# Patient Record
Sex: MALE | Race: WHITE | NOT HISPANIC OR LATINO | Employment: FULL TIME | URBAN - METROPOLITAN AREA
[De-identification: names, ages, dates, MRNs, and addresses within clinical notes are randomized per-mention and may not be internally consistent; named-entity substitution may affect disease eponyms.]

---

## 2017-01-06 ENCOUNTER — OFFICE VISIT (OUTPATIENT)
Dept: INTERNAL MEDICINE | Facility: CLINIC | Age: 67
End: 2017-01-06

## 2017-01-06 VITALS
DIASTOLIC BLOOD PRESSURE: 68 MMHG | WEIGHT: 176.6 LBS | RESPIRATION RATE: 16 BRPM | HEART RATE: 76 BPM | HEIGHT: 70 IN | OXYGEN SATURATION: 99 % | BODY MASS INDEX: 25.28 KG/M2 | SYSTOLIC BLOOD PRESSURE: 128 MMHG | TEMPERATURE: 97.9 F

## 2017-01-06 DIAGNOSIS — I26.99 PE (PULMONARY THROMBOEMBOLISM) (HCC): ICD-10-CM

## 2017-01-06 DIAGNOSIS — I82.412 ACUTE DEEP VEIN THROMBOSIS (DVT) OF FEMORAL VEIN OF LEFT LOWER EXTREMITY (HCC): Primary | ICD-10-CM

## 2017-01-06 PROCEDURE — 99213 OFFICE O/P EST LOW 20 MIN: CPT | Performed by: INTERNAL MEDICINE

## 2017-01-06 NOTE — MR AVS SNAPSHOT
Dennis De Anda   1/6/2017 9:20 AM   Office Visit    Provider:  Scott Gonzales MD   Department:  [facility] INTERNAL MEDICINE   Dept Phone:  [phone]                Your Full Care Plan              Today's Medication Changes          These changes are accurate as of: 1/6/17 10:28 AM.  If you have any questions, ask your nurse or doctor.               Medication(s)that have changed:     rivaroxaban 20 MG tablet   Commonly known as:  XARELTO   Take 1 tablet by mouth Daily.   What changed:  Another medication with the same name was removed. Continue taking this medication, and follow the directions you see here.   Changed by:  Scott Gonzales MD                  Your Updated Medication List          This list is accurate as of: 1/6/17 10:28 AM.  Always use your most recent med list.                rivaroxaban 20 MG tablet   Commonly known as:  XARELTO   Take 1 tablet by mouth Daily.               You Were Diagnosed With        Codes Comments    Acute deep vein thrombosis (DVT) of femoral vein of left lower extremity    -  Primary ICD-10-CM: I82.412  ICD-9-CM: 453.41     PE (pulmonary thromboembolism)     ICD-10-CM: I26.99  ICD-9-CM: 415.19       Instructions     None    Patient Instructions History      MyChart Signup     Our records indicate that you have an active [employer] account.    You can view your After Visit Summary by going to [employer] and logging in with your [email or web address] username and password.  If you don't have a [email or web address] username and password but a parent or guardian has access to your record, the parent or guardian should login with their own [email or web address] username and password and access your record to view the After Visit Summary.    If you have questions, you can email [email or web address] or call [phone] to talk to our [email or web address] staff.  Remember, [email or web address] is NOT to be used for urgent needs.  For medical emergencies, dial  "911.               Other Info from Your Visit           Your Appointments     Jun 09, 2017  9:00 AM EDT   Follow Up with Scott Gonzales MD   [facility] INTERNAL MEDICINE (--)    [address] [id number] [phone]           Arrive 15 minutes prior to appointment.            Aug 11, 2017  8:30 AM EDT   Physical with Scott Gonzales MD   [facility] INTERNAL MEDICINE (--)    [address] [id number] [phone]           Arrive 15 minutes prior to appointment.              Allergies     Penicillins  Rash    At injection site      Reason for Visit     Deep Vein Thrombosis           Vital Signs     Blood Pressure Pulse Temperature Respirations Height Weight    128/68 (BP Location: Left arm, Patient Position: Sitting, Cuff Size: Large Adult) 76 97.9 °F (36.6 °C) (Oral) 16 70\" (177.8 cm) 176 lb 9.6 oz (80.1 kg)    Oxygen Saturation Body Mass Index Smoking Status             99% 25.34 kg/m2 Never Smoker         Problems and Diagnoses Noted     Blood clot in vein    PE (pulmonary thromboembolism)      "

## 2017-01-06 NOTE — PROGRESS NOTES
Subjective   Dennis De Anda is a 66 y.o. male.     Chief Complaint   Patient presents with   • Deep Vein Thrombosis         HPI Comments: In for follow-up of hospitalization with a DVT and PE.  Just a few small peripheral PEs on CT scan.  Extensive DVT left leg which was proximal femoral lead to thrombolysis and embolectomy.  Left arm pain which is been present for several weeks is now resolved completely.    Deep Vein Thrombosis   Pertinent negatives include no chest pain, chills, coughing, fatigue or fever.        The following portions of the patient's history were reviewed and updated as appropriate: allergies, current medications, past social history and problem list.    Outpatient Prescriptions Marked as Taking for the 1/6/17 encounter (Office Visit) with Scott Gonzales MD   Medication Sig Dispense Refill   • rivaroxaban (XARELTO) 20 MG tablet Take 1 tablet by mouth Daily. 90 tablet 0       Review of Systems   Constitutional: Negative for chills, fatigue and fever.   Respiratory: Negative for cough, shortness of breath and wheezing.    Cardiovascular: Positive for leg swelling. Negative for chest pain and palpitations.       Objective   Vitals:    01/06/17 0923   BP: 128/68   Pulse: 76   Resp: 16   Temp: 97.9 °F (36.6 °C)   SpO2: 99%      Last Weight    01/06/17  0923   Weight: 176 lb 9.6 oz (80.1 kg)    [unfilled]  Body mass index is 25.34 kg/(m^2).      Physical Exam   Constitutional: He appears well-developed and well-nourished.   Cardiovascular: Normal rate, regular rhythm, normal heart sounds and intact distal pulses.    Pulmonary/Chest: Effort normal and breath sounds normal.         Problem List Items Addressed This Visit        Cardiovascular and Mediastinum    PE (pulmonary thromboembolism)    Acute deep vein thrombosis (DVT) of femoral vein of left lower extremity - Primary        Assessment/Plan   In for recheck of DVT and PE.  Small peripheral PE but extensive DVT left leg.  Had thrombolysis  and embolectomy.  Being followed by vascular surgery.  The DVT occurred following plane ride to [place] followed 3 weeks later by a road trip/car ride to [facility].  He had some left shoulder pain for several months that was unusual and now is clear completely.  Hard to know whether that was cervical radiculopathy or rhythm was related to the PEs.  For now the plan is 6 months of treatment with Xarelto.  He's using a support hose. Hypercoagulation studies have come back normal.  No other inciting events or risk factors noted.  He may need a dental extraction but is going to put that off as long as he can.  Has an upcoming venous scan per vascular surgery.         Dragon disclaimer:   Much of this encounter note is an electronic transcription/translation of spoken language to printed text. The electronic translation of spoken language may permit erroneous, or at times, nonsensical words or phrases to be inadvertently transcribed; Although I have reviewed the note for such errors, some may still exist.

## 2017-03-24 RX ORDER — RIVAROXABAN 20 MG/1
TABLET, FILM COATED ORAL
Qty: 90 TABLET | Refills: 1 | Status: SHIPPED | OUTPATIENT
Start: 2017-03-24 | End: 2017-05-12 | Stop reason: SDUPTHER

## 2017-05-12 ENCOUNTER — OFFICE VISIT (OUTPATIENT)
Dept: INTERNAL MEDICINE | Facility: CLINIC | Age: 67
End: 2017-05-12

## 2017-05-12 VITALS
HEIGHT: 70 IN | DIASTOLIC BLOOD PRESSURE: 68 MMHG | OXYGEN SATURATION: 97 % | WEIGHT: 171.8 LBS | SYSTOLIC BLOOD PRESSURE: 122 MMHG | HEART RATE: 67 BPM | BODY MASS INDEX: 24.6 KG/M2 | TEMPERATURE: 98.1 F | RESPIRATION RATE: 16 BRPM

## 2017-05-12 DIAGNOSIS — I82.412 ACUTE DEEP VEIN THROMBOSIS (DVT) OF FEMORAL VEIN OF LEFT LOWER EXTREMITY (HCC): Primary | ICD-10-CM

## 2017-05-12 PROCEDURE — G0438 PPPS, INITIAL VISIT: HCPCS | Performed by: INTERNAL MEDICINE

## 2017-05-12 PROCEDURE — 99214 OFFICE O/P EST MOD 30 MIN: CPT | Performed by: INTERNAL MEDICINE

## 2017-06-27 ENCOUNTER — OFFICE VISIT (OUTPATIENT)
Dept: INTERNAL MEDICINE | Facility: CLINIC | Age: 67
End: 2017-06-27

## 2017-06-27 VITALS
BODY MASS INDEX: 24.08 KG/M2 | RESPIRATION RATE: 16 BRPM | SYSTOLIC BLOOD PRESSURE: 134 MMHG | TEMPERATURE: 98 F | HEART RATE: 57 BPM | HEIGHT: 70 IN | DIASTOLIC BLOOD PRESSURE: 70 MMHG | OXYGEN SATURATION: 98 % | WEIGHT: 168.2 LBS

## 2017-06-27 DIAGNOSIS — E55.9 VITAMIN D DEFICIENCY: ICD-10-CM

## 2017-06-27 DIAGNOSIS — R73.02 IGT (IMPAIRED GLUCOSE TOLERANCE): ICD-10-CM

## 2017-06-27 DIAGNOSIS — C61 PROSTATE CANCER (HCC): ICD-10-CM

## 2017-06-27 DIAGNOSIS — R35.89 POLYURIA: ICD-10-CM

## 2017-06-27 DIAGNOSIS — M79.605 PAIN OF LEFT LOWER EXTREMITY: Primary | ICD-10-CM

## 2017-06-27 LAB
25(OH)D3+25(OH)D2 SERPL-MCNC: 30.7 NG/ML (ref 30–100)
ALBUMIN SERPL-MCNC: 4.4 G/DL (ref 3.5–5.2)
ALBUMIN/GLOB SERPL: 1.8 G/DL
ALP SERPL-CCNC: 50 U/L (ref 39–117)
ALT SERPL-CCNC: 19 U/L (ref 1–41)
AST SERPL-CCNC: 19 U/L (ref 1–40)
BASOPHILS # BLD AUTO: 0.01 10*3/MM3 (ref 0–0.2)
BASOPHILS NFR BLD AUTO: 0.2 % (ref 0–1.5)
BILIRUB SERPL-MCNC: 0.5 MG/DL (ref 0.1–1.2)
BUN SERPL-MCNC: 14 MG/DL (ref 8–23)
BUN/CREAT SERPL: 15.2 (ref 7–25)
CALCIUM SERPL-MCNC: 9.5 MG/DL (ref 8.6–10.5)
CHLORIDE SERPL-SCNC: 101 MMOL/L (ref 98–107)
CO2 SERPL-SCNC: 23.2 MMOL/L (ref 22–29)
CREAT SERPL-MCNC: 0.92 MG/DL (ref 0.76–1.27)
EOSINOPHIL # BLD AUTO: 0.03 10*3/MM3 (ref 0–0.7)
EOSINOPHIL NFR BLD AUTO: 0.7 % (ref 0.3–6.2)
ERYTHROCYTE [DISTWIDTH] IN BLOOD BY AUTOMATED COUNT: 12.9 % (ref 11.5–14.5)
GLOBULIN SER CALC-MCNC: 2.5 GM/DL
GLUCOSE SERPL-MCNC: 93 MG/DL (ref 65–99)
HBA1C MFR BLD: 5.5 % (ref 4.8–5.6)
HCT VFR BLD AUTO: 43 % (ref 40.4–52.2)
HGB BLD-MCNC: 14.1 G/DL (ref 13.7–17.6)
IMM GRANULOCYTES # BLD: 0 10*3/MM3 (ref 0–0.03)
IMM GRANULOCYTES NFR BLD: 0 % (ref 0–0.5)
LYMPHOCYTES # BLD AUTO: 1.44 10*3/MM3 (ref 0.9–4.8)
LYMPHOCYTES NFR BLD AUTO: 31.6 % (ref 19.6–45.3)
MCH RBC QN AUTO: 30.3 PG (ref 27–32.7)
MCHC RBC AUTO-ENTMCNC: 32.8 G/DL (ref 32.6–36.4)
MCV RBC AUTO: 92.5 FL (ref 79.8–96.2)
MONOCYTES # BLD AUTO: 0.49 10*3/MM3 (ref 0.2–1.2)
MONOCYTES NFR BLD AUTO: 10.8 % (ref 5–12)
NEUTROPHILS # BLD AUTO: 2.58 10*3/MM3 (ref 1.9–8.1)
NEUTROPHILS NFR BLD AUTO: 56.7 % (ref 42.7–76)
PLATELET # BLD AUTO: 183 10*3/MM3 (ref 140–500)
POTASSIUM SERPL-SCNC: 4.4 MMOL/L (ref 3.5–5.2)
PROT SERPL-MCNC: 6.9 G/DL (ref 6–8.5)
PSA SERPL-MCNC: 0.06 NG/ML (ref 0–4)
RBC # BLD AUTO: 4.65 10*6/MM3 (ref 4.6–6)
SODIUM SERPL-SCNC: 140 MMOL/L (ref 136–145)
WBC # BLD AUTO: 4.55 10*3/MM3 (ref 4.5–10.7)

## 2017-06-27 PROCEDURE — 99213 OFFICE O/P EST LOW 20 MIN: CPT | Performed by: INTERNAL MEDICINE

## 2017-06-27 NOTE — PROGRESS NOTES
Subjective   Dennis De Anda is a 67 y.o. male.     Chief Complaint   Patient presents with   • Leg Pain     Left leg pain         Leg Pain    The incident occurred more than 1 week ago. There was no injury mechanism. The pain is present in the left leg. The quality of the pain is described as aching. The pain is moderate. The pain has been constant since onset. Nothing aggravates the symptoms. He has tried nothing for the symptoms.        The following portions of the patient's history were reviewed and updated as appropriate: allergies, current medications, past social history and problem list.    Outpatient Prescriptions Marked as Taking for the 6/27/17 encounter (Office Visit) with Scott Gonzales MD   Medication Sig Dispense Refill   • apixaban (ELIQUIS) 2.5 MG tablet tablet Take 1 tablet by mouth 2 (Two) Times a Day. 180 tablet 3       Review of Systems   Constitutional: Negative for chills and fever.   Respiratory: Negative for shortness of breath and wheezing.    Cardiovascular: Positive for leg swelling.   Musculoskeletal: Positive for myalgias.       Objective   Vitals:    06/27/17 1402   BP: 134/70   Pulse: 57   Resp: 16   Temp: 98 °F (36.7 °C)   SpO2: 98%      Last Weight    06/27/17  1402   Weight: 168 lb 3.2 oz (76.3 kg)    [unfilled]  Body mass index is 24.13 kg/(m^2).      Physical Exam   Constitutional: He appears well-developed and well-nourished.   Cardiovascular: Normal rate, regular rhythm, normal heart sounds and intact distal pulses.  Exam reveals no friction rub.    No murmur heard.  Pulmonary/Chest: Effort normal and breath sounds normal. No respiratory distress. He has no wheezes. He has no rales.   Musculoskeletal: Normal range of motion. He exhibits edema. He exhibits no tenderness or deformity.         Problem List Items Addressed This Visit        Digestive    Vitamin D deficiency    Relevant Orders    Vitamin D 25 Hydroxy       Endocrine    IGT (impaired glucose tolerance)     Relevant Orders    Hemoglobin A1c       Genitourinary    Prostate cancer    Relevant Orders    PSA      Other Visit Diagnoses     Pain of left lower extremity    -  Primary    Polyuria        Relevant Orders    CBC & Differential    Comprehensive Metabolic Panel        Assessment/Plan   In with left leg pain.  He's had some low-grade pain ever since his large DVT November 2016.  This seems worse in the past week.  He was up last night with some cramps in the left calf.  Some of his pain is right behind his knee in the popliteal fossa.  He has known osteoarthritis in the left knee.  Exam today is unremarkable.  He has very little swelling.  He's been using a support hose.  He is on low dose eliquis.  I think recurrent DVT would be most unusual in this setting.  Question of today is whether he is got some chronic pain syndrome related to his prior DVT and instrumentation or whether he's got a second cause of leg pain mainly restless leg or DJD.  We'll have vascular surgeons see him again.  He also complains of polyuria.  Some lab work today including CBC, CMP, UA, PSA, and vitamin D level.         Rudolph disclaimer:   Much of this encounter note is an electronic transcription/translation of spoken language to printed text. The electronic translation of spoken language may permit erroneous, or at times, nonsensical words or phrases to be inadvertently transcribed; Although I have reviewed the note for such errors, some may still exist.

## 2017-11-17 ENCOUNTER — OFFICE VISIT (OUTPATIENT)
Dept: INTERNAL MEDICINE | Facility: CLINIC | Age: 67
End: 2017-11-17

## 2017-11-17 VITALS
BODY MASS INDEX: 25.08 KG/M2 | HEART RATE: 51 BPM | TEMPERATURE: 97.7 F | SYSTOLIC BLOOD PRESSURE: 126 MMHG | WEIGHT: 175.2 LBS | OXYGEN SATURATION: 96 % | DIASTOLIC BLOOD PRESSURE: 70 MMHG | RESPIRATION RATE: 16 BRPM | HEIGHT: 70 IN

## 2017-11-17 DIAGNOSIS — I82.412 ACUTE DEEP VEIN THROMBOSIS (DVT) OF FEMORAL VEIN OF LEFT LOWER EXTREMITY (HCC): ICD-10-CM

## 2017-11-17 DIAGNOSIS — Z23 NEED FOR IMMUNIZATION AGAINST INFLUENZA: Primary | ICD-10-CM

## 2017-11-17 DIAGNOSIS — Z23 NEED FOR VACCINATION: ICD-10-CM

## 2017-11-17 DIAGNOSIS — R73.02 IGT (IMPAIRED GLUCOSE TOLERANCE): ICD-10-CM

## 2017-11-17 PROCEDURE — 99213 OFFICE O/P EST LOW 20 MIN: CPT | Performed by: INTERNAL MEDICINE

## 2017-11-17 PROCEDURE — G0009 ADMIN PNEUMOCOCCAL VACCINE: HCPCS | Performed by: INTERNAL MEDICINE

## 2017-11-17 PROCEDURE — G0008 ADMIN INFLUENZA VIRUS VAC: HCPCS | Performed by: INTERNAL MEDICINE

## 2017-11-17 PROCEDURE — 90686 IIV4 VACC NO PRSV 0.5 ML IM: CPT | Performed by: INTERNAL MEDICINE

## 2017-11-17 PROCEDURE — 90670 PCV13 VACCINE IM: CPT | Performed by: INTERNAL MEDICINE

## 2017-11-17 NOTE — PROGRESS NOTES
Subjective   Dennis De Anda is a 67 y.o. male.     Chief Complaint   Patient presents with   • Anemia   • Hyperglycemia   • Deep Vein Thrombosis         HPI Comments: In for recheck of DVT and PE.  Just a few small peripheral PEs on CT scan.  Extensive DVT left leg which was proximal femoral lead to thrombolysis and embolectomy.      Anemia   There has been no fever.   Hyperglycemia   This is a chronic problem. The current episode started more than 1 year ago. The problem occurs constantly. The problem has been unchanged. Pertinent negatives include no chest pain, chills, coughing, fatigue or fever.   Deep Vein Thrombosis   This is a chronic problem. The current episode started more than 1 month ago. The problem has been gradually improving. Pertinent negatives include no chest pain, chills, coughing, fatigue or fever. The symptoms are aggravated by standing.        The following portions of the patient's history were reviewed and updated as appropriate: allergies, current medications, past social history and problem list.    Outpatient Prescriptions Marked as Taking for the 11/17/17 encounter (Office Visit) with Scott Gonzales MD   Medication Sig Dispense Refill   • apixaban (ELIQUIS) 2.5 MG tablet tablet Take 1 tablet by mouth 2 (Two) Times a Day. 180 tablet 3       Review of Systems   Constitutional: Negative for chills, fatigue and fever.   Respiratory: Negative for cough, shortness of breath and wheezing.    Cardiovascular: Positive for leg swelling. Negative for chest pain.       Objective   Vitals:    11/17/17 0814   BP: 126/70   Pulse: 51   Resp: 16   Temp: 97.7 °F (36.5 °C)   SpO2: 96%      Last Weight    11/17/17 0814   Weight: 175 lb 3.2 oz (79.5 kg)    [unfilled]  Body mass index is 25.14 kg/(m^2).      Physical Exam   Constitutional: He appears well-developed and well-nourished.   Cardiovascular: Normal rate, regular rhythm, normal heart sounds and intact distal pulses.    Pulmonary/Chest:  Effort normal and breath sounds normal.         Problem List Items Addressed This Visit     None      Visit Diagnoses     Need for immunization against influenza    -  Primary    Relevant Orders    Flu Vaccine Quad PF 3YR+ (Completed)        Assessment/Plan   In for recheck of DVT and PE.  Small peripheral PE but extensive DVT left leg.  Had thrombolysis and embolectomy.  The DVT occurred following plane ride to [place] followed 3 weeks later by a road trip/car ride to [facility].  He had some left shoulder pain for several months that was unusual and now is clear completely.  Hard to know whether that was cervical radiculopathy or was related to the PEs.  For now he has completed 6 months of treatment with Xarelto.  Low-dose prophylaxis program.  He's using a support hose.  Annual wellness visit done today.  We will recheck in 6 months.  Greater than 15 minutes spent with patient, over 50% counseling him on his dvt.  We'll plan a minimum of one more year of low dose eliquis.  At least out to December 2018.  Flu shot today.  Prevnar today.  Pneumovax November 2018.  Annual lab work May 2018.         Dragon disclaimer:   Much of this encounter note is an electronic transcription/translation of spoken language to printed text. The electronic translation of spoken language may permit erroneous, or at times, nonsensical words or phrases to be inadvertently transcribed; Although I have reviewed the note for such errors, some may still exist.

## 2018-05-31 ENCOUNTER — OFFICE VISIT (OUTPATIENT)
Dept: INTERNAL MEDICINE | Facility: CLINIC | Age: 68
End: 2018-05-31

## 2018-05-31 VITALS
DIASTOLIC BLOOD PRESSURE: 64 MMHG | SYSTOLIC BLOOD PRESSURE: 128 MMHG | BODY MASS INDEX: 24.65 KG/M2 | TEMPERATURE: 98.1 F | RESPIRATION RATE: 16 BRPM | OXYGEN SATURATION: 97 % | WEIGHT: 172.2 LBS | HEART RATE: 56 BPM | HEIGHT: 70 IN

## 2018-05-31 DIAGNOSIS — I82.412 ACUTE DEEP VEIN THROMBOSIS (DVT) OF FEMORAL VEIN OF LEFT LOWER EXTREMITY (HCC): ICD-10-CM

## 2018-05-31 DIAGNOSIS — C61 PROSTATE CANCER (HCC): ICD-10-CM

## 2018-05-31 DIAGNOSIS — I26.99 PE (PULMONARY THROMBOEMBOLISM) (HCC): Primary | ICD-10-CM

## 2018-05-31 DIAGNOSIS — G47.33 OSA (OBSTRUCTIVE SLEEP APNEA): ICD-10-CM

## 2018-05-31 DIAGNOSIS — R73.02 IGT (IMPAIRED GLUCOSE TOLERANCE): ICD-10-CM

## 2018-05-31 DIAGNOSIS — Z79.899 MEDICATION MANAGEMENT: ICD-10-CM

## 2018-05-31 LAB
ALBUMIN SERPL-MCNC: 4.2 G/DL (ref 3.5–5.2)
ALBUMIN/GLOB SERPL: 1.8 G/DL
ALP SERPL-CCNC: 54 U/L (ref 39–117)
ALT SERPL-CCNC: 24 U/L (ref 1–41)
AST SERPL-CCNC: 26 U/L (ref 1–40)
BASOPHILS # BLD AUTO: 0.01 10*3/MM3 (ref 0–0.2)
BASOPHILS NFR BLD AUTO: 0.2 % (ref 0–1.5)
BILIRUB SERPL-MCNC: 0.3 MG/DL (ref 0.1–1.2)
BUN SERPL-MCNC: 20 MG/DL (ref 8–23)
BUN/CREAT SERPL: 20 (ref 7–25)
CALCIUM SERPL-MCNC: 8.9 MG/DL (ref 8.6–10.5)
CHLORIDE SERPL-SCNC: 102 MMOL/L (ref 98–107)
CHOLEST SERPL-MCNC: 236 MG/DL (ref 0–200)
CO2 SERPL-SCNC: 23.5 MMOL/L (ref 22–29)
CREAT SERPL-MCNC: 1 MG/DL (ref 0.76–1.27)
EOSINOPHIL # BLD AUTO: 0.1 10*3/MM3 (ref 0–0.7)
EOSINOPHIL NFR BLD AUTO: 2.3 % (ref 0.3–6.2)
ERYTHROCYTE [DISTWIDTH] IN BLOOD BY AUTOMATED COUNT: 13.6 % (ref 11.5–14.5)
GFR SERPLBLD CREATININE-BSD FMLA CKD-EPI: 75 ML/MIN/1.73
GFR SERPLBLD CREATININE-BSD FMLA CKD-EPI: 90 ML/MIN/1.73
GLOBULIN SER CALC-MCNC: 2.3 GM/DL
GLUCOSE SERPL-MCNC: 96 MG/DL (ref 65–99)
HBA1C MFR BLD: 5.5 % (ref 4.8–5.6)
HCT VFR BLD AUTO: 44.1 % (ref 40.4–52.2)
HDLC SERPL-MCNC: 73 MG/DL (ref 40–60)
HGB BLD-MCNC: 13.8 G/DL (ref 13.7–17.6)
IMM GRANULOCYTES # BLD: 0 10*3/MM3 (ref 0–0.03)
IMM GRANULOCYTES NFR BLD: 0 % (ref 0–0.5)
LDLC SERPL CALC-MCNC: 148 MG/DL (ref 0–100)
LYMPHOCYTES # BLD AUTO: 1.66 10*3/MM3 (ref 0.9–4.8)
LYMPHOCYTES NFR BLD AUTO: 37.8 % (ref 19.6–45.3)
MCH RBC QN AUTO: 29.9 PG (ref 27–32.7)
MCHC RBC AUTO-ENTMCNC: 31.3 G/DL (ref 32.6–36.4)
MCV RBC AUTO: 95.7 FL (ref 79.8–96.2)
MONOCYTES # BLD AUTO: 0.46 10*3/MM3 (ref 0.2–1.2)
MONOCYTES NFR BLD AUTO: 10.5 % (ref 5–12)
NEUTROPHILS # BLD AUTO: 2.16 10*3/MM3 (ref 1.9–8.1)
NEUTROPHILS NFR BLD AUTO: 49.2 % (ref 42.7–76)
PLATELET # BLD AUTO: 159 10*3/MM3 (ref 140–500)
POTASSIUM SERPL-SCNC: 4.4 MMOL/L (ref 3.5–5.2)
PROT SERPL-MCNC: 6.5 G/DL (ref 6–8.5)
PSA SERPL-MCNC: 0.08 NG/ML (ref 0–4)
RBC # BLD AUTO: 4.61 10*6/MM3 (ref 4.6–6)
SODIUM SERPL-SCNC: 140 MMOL/L (ref 136–145)
TRIGL SERPL-MCNC: 74 MG/DL (ref 0–150)
VLDLC SERPL CALC-MCNC: 14.8 MG/DL (ref 5–40)
WBC # BLD AUTO: 4.39 10*3/MM3 (ref 4.5–10.7)

## 2018-05-31 PROCEDURE — G0439 PPPS, SUBSEQ VISIT: HCPCS | Performed by: INTERNAL MEDICINE

## 2018-05-31 PROCEDURE — 99214 OFFICE O/P EST MOD 30 MIN: CPT | Performed by: INTERNAL MEDICINE

## 2018-05-31 NOTE — PATIENT INSTRUCTIONS
Chronic Pain, Adult  Chronic pain is a type of pain that lasts or keeps coming back (recurs) for at least six months. You may have chronic headaches, abdominal pain, or body pain. Chronic pain may be related to an illness, such as fibromyalgia or complex regional pain syndrome. Sometimes the cause of chronic pain is not known.  Chronic pain can make it hard for you to do daily activities. If not treated, chronic pain can lead to other health problems, including anxiety and depression. Treatment depends on the cause and severity of your pain. You may need to work with a pain specialist to come up with a treatment plan. The plan may include medicine, counseling, and physical therapy. Many people benefit from a combination of two or more types of treatment to control their pain.  Follow these instructions at home:  Lifestyle   · Consider keeping a pain diary to share with your health care providers.  · Consider talking with a mental health care provider (psychologist) about how to cope with chronic pain.  · Consider joining a chronic pain support group.  · Try to control or lower your stress levels. Talk to your health care provider about strategies to do this.  General instructions     · Take over-the-counter and prescription medicines only as told by your health care provider.  · Follow your treatment plan as told by your health care provider. This may include:  ¨ Gentle, regular exercise.  ¨ Eating a healthy diet that includes foods such as vegetables, fruits, fish, and lean meats.  ¨ Cognitive or behavioral therapy.  ¨ Working with a physical therapist.  ¨ Meditation or yoga.  ¨ Acupuncture or massage therapy.  ¨ Aroma, color, light, or sound therapy.  ¨ Local electrical stimulation.  ¨ Shots (injections) of numbing or pain-relieving medicines into the spine or the area of pain.  · Check your pain level as told by your health care provider. Ask your health care provider if you should use a pain scale.  · Learn as  much as you can about how to manage your chronic pain. Ask your health care provider if an intensive pain rehabilitation program or a chronic pain specialist would be helpful.  · Keep all follow-up visits as told by your health care provider. This is important.  Contact a health care provider if:  · Your pain gets worse.  · You have new pain.  · You have trouble sleeping.  · You have trouble doing your normal activities.  · Your pain is not controlled with treatment.  · Your have side effects from pain medicine.  · You feel weak.  Get help right away if:  · You lose feeling or have numbness in your body.  · You lose control of bowel or bladder function.  · Your pain suddenly gets much worse.  · You develop shaking or chills.  · You develop confusion.  · You develop chest pain.  · You have trouble breathing or shortness of breath.  · You pass out.  · You have thoughts about hurting yourself or others.  This information is not intended to replace advice given to you by your health care provider. Make sure you discuss any questions you have with your health care provider.  Document Released: 09/09/2003 Document Revised: 08/17/2017 Document Reviewed: 06/06/2017  [employer] Interactive Patient Education © 2017 [employer]  Exercising to Stay Healthy  Exercising regularly is important. It has many health benefits, such as:  · Improving your overall fitness, flexibility, and endurance.  · Increasing your bone density.  · Helping with weight control.  · Decreasing your body fat.  · Increasing your muscle strength.  · Reducing stress and tension.  · Improving your overall health.  In order to become healthy and stay healthy, it is recommended that you do moderate-intensity and vigorous-intensity exercise. You can tell that you are exercising at a moderate intensity if you have a higher heart rate and faster breathing, but you are still able to hold a conversation. You can tell that you are exercising at a vigorous intensity  if you are breathing much harder and faster and cannot hold a conversation while exercising.  How often should I exercise?  Choose an activity that you enjoy and set realistic goals. Your health care provider can help you to make an activity plan that works for you. Exercise regularly as directed by your health care provider. This may include:  · Doing resistance training twice each week, such as:  ¨ Push-ups.  ¨ Sit-ups.  ¨ Lifting weights.  ¨ Using resistance bands.  · Doing a given intensity of exercise for a given amount of time. Choose from these options:  ¨ 150 minutes of moderate-intensity exercise every week.  ¨ 75 minutes of vigorous-intensity exercise every week.  ¨ A mix of moderate-intensity and vigorous-intensity exercise every week.  Children, pregnant women, people who are out of shape, people who are overweight, and older adults may need to consult a health care provider for individual recommendations. If you have any sort of medical condition, be sure to consult your health care provider before starting a new exercise program.  What are some exercise ideas?  Some moderate-intensity exercise ideas include:  · Walking at a rate of 1 mile in 15 minutes.  · Biking.  · Hiking.  · Golfing.  · Dancing.  Some vigorous-intensity exercise ideas include:  · Walking at a rate of at least 4.5 miles per hour.  · Jogging or running at a rate of 5 miles per hour.  · Biking at a rate of at least 10 miles per hour.  · Lap swimming.  · Roller-skating or in-line skating.  · Cross-country skiing.  · Vigorous competitive sports, such as football, basketball, and soccer.  · Jumping rope.  · Aerobic dancing.  What are some everyday activities that can help me to get exercise?  · Yard work, such as:  ¨ Pushing a .  ¨ Raking and bagging leaves.  · Washing and waxing your car.  · Pushing a stroller.  · Shoveling snow.  · Gardening.  · Washing windows or floors.  How can I be more active in my day-to-day  activities?  · Use the stairs instead of the elevator.  · Take a walk during your lunch break.  · If you drive, park your car farther away from work or school.  · If you take public transportation, get off one stop early and walk the rest of the way.  · Make all of your phone calls while standing up and walking around.  · Get up, stretch, and walk around every 30 minutes throughout the day.  What guidelines should I follow while exercising?  · Do not exercise so much that you hurt yourself, feel dizzy, or get very short of breath.  · Consult your health care provider before starting a new exercise program.  · Wear comfortable clothes and shoes with good support.  · Drink plenty of water while you exercise to prevent dehydration or heat stroke. Body water is lost during exercise and must be replaced.  · Work out until you breathe faster and your heart beats faster.  This information is not intended to replace advice given to you by your health care provider. Make sure you discuss any questions you have with your health care provider.  Document Released: 01/20/2012 Document Revised: 05/25/2017 Document Reviewed: 05/21/2015  [employer] Interactive Patient Education © 2017 [employer]  BMI for Adults  Body mass index (BMI) is a number that is calculated from a person's weight and height. In most adults, the number is used to find how much of an adult's weight is made up of fat. BMI is not as accurate as a direct measure of body fat.  How is BMI calculated?  BMI is calculated by dividing weight in kilograms by height in meters squared. It can also be calculated by dividing weight in pounds by height in inches squared, then multiplying the resulting number by 703. Charts are available to help you find your BMI quickly and easily without doing this calculation.  How is BMI interpreted?  Health care professionals use BMI charts to identify whether an adult is underweight, at a normal weight, or overweight based on the  following guidelines:  · Underweight: BMI less than 18.5.  · Normal weight: BMI between 18.5 and 24.9.  · Overweight: BMI between 25 and 29.9.  · Obese: BMI of 30 and above.  BMI is usually interpreted the same for males and females.  Weight includes both fat and muscle, so someone with a muscular build, such as an athlete, may have a BMI that is higher than 24.9. In cases like these, BMI may not accurately depict body fat. To determine if excess body fat is the cause of a BMI of 25 or higher, further assessments may need to be done by a health care provider.  Why is BMI a useful tool?  BMI is used to identify a possible weight problem that may be related to a medical problem or may increase the risk for medical problems. BMI can also be used to promote changes to reach a healthy weight.  This information is not intended to replace advice given to you by your health care provider. Make sure you discuss any questions you have with your health care provider.  Document Released: 08/29/2005 Document Revised: 04/27/2017 Document Reviewed: 05/15/2015  [employer] Interactive Patient Education © 2017 [employer]

## 2018-05-31 NOTE — PROGRESS NOTES
Subjective   Dennis De Anda is a 68 y.o. male.     Chief Complaint   Patient presents with   • Deep Vein Thrombosis   • Heartburn         In for recheck of DVT and PE.  Just a few small peripheral PEs on CT scan.  Extensive DVT left leg which was proximal femoral lead to thrombolysis and embolectomy.        Deep Vein Thrombosis   This is a chronic problem. The current episode started more than 1 month ago. The problem has been gradually improving. Pertinent negatives include no chest pain, chills, coughing, fatigue or fever. The symptoms are aggravated by standing.   Heartburn   He reports no chest pain, no coughing or no wheezing. Pertinent negatives include no fatigue.   Anemia   Presents for follow-up visit. There has been no fever.   Hyperglycemia   This is a chronic problem. The current episode started more than 1 year ago. The problem occurs constantly. The problem has been unchanged. Pertinent negatives include no chest pain, chills, coughing, fatigue or fever.        The following portions of the patient's history were reviewed and updated as appropriate: allergies, current medications, past social history and problem list.    Outpatient Prescriptions Marked as Taking for the 5/31/18 encounter (Office Visit) with Scott Gonzales MD   Medication Sig Dispense Refill   • apixaban (ELIQUIS) 2.5 MG tablet tablet Take 1 tablet by mouth 2 (Two) Times a Day. 180 tablet 3       Review of Systems   Constitutional: Negative for chills, fatigue and fever.   Respiratory: Negative for cough, shortness of breath and wheezing.    Cardiovascular: Positive for leg swelling. Negative for chest pain.       Objective   Vitals:    05/31/18 0755   BP: 128/64   Pulse: 56   Resp: 16   Temp: 98.1 °F (36.7 °C)   SpO2: 97%      1    05/31/18  0755   Weight: 78.1 kg (172 lb 3.2 oz)    [unfilled]  Body mass index is 24.71 kg/m².      Physical Exam   Constitutional: He appears well-developed and well-nourished.   Cardiovascular:  Normal rate, regular rhythm, normal heart sounds and intact distal pulses.    Pulmonary/Chest: Effort normal and breath sounds normal.         Problem List Items Addressed This Visit        Cardiovascular and Mediastinum    PE (pulmonary thromboembolism) - Primary    Acute deep vein thrombosis (DVT) of femoral vein of left lower extremity       Respiratory    TRIXIE (obstructive sleep apnea)       Endocrine    IGT (impaired glucose tolerance)    Relevant Orders    Hemoglobin A1c       Genitourinary    Prostate cancer    Relevant Orders    PSA DIAGNOSTIC      Other Visit Diagnoses     Medication management        Relevant Orders    CBC & Differential    Comprehensive Metabolic Panel    Lipid Panel        Assessment/Plan   In for recheck of DVT and PE.  Small peripheral PE but extensive DVT left leg.  Had thrombolysis and embolectomy.  The DVT occurred following plane ride to [place] followed 3 weeks later by a road trip/car ride to [facility].  For now he has completed 6 months of treatment with Xarelto.  Low-dose prophylaxis program.  He's using a support hose.  Not wearing the support ptosis much in warm weather since he's wearing shorts.  Annual wellness visit done today.  We will recheck in 6 months.  Greater than 15 minutes spent with patient, over 50% counseling him on his dvt.  We'll plan a minimum of one more year of low dose eliquis.  At least out to December 2018.  Pneumovax November 2018.  Annual lab work today May 2018.  Shingrix is due.  Hep A vaccination due.  Last 2 weeks she's had perhaps 6 episodes of brief tachycardia.  Last up to 10 seconds.         Dragon disclaimer:   Much of this encounter note is an electronic transcription/translation of spoken language to printed text. The electronic translation of spoken language may permit erroneous, or at times, nonsensical words or phrases to be inadvertently transcribed; Although I have reviewed the note for such errors, some may still exist.

## 2018-05-31 NOTE — PROGRESS NOTES
QUICK REFERENCE INFORMATION:  The ABCs of the Annual Wellness Visit    Subsequent Medicare Wellness Visit    HEALTH RISK ASSESSMENT    1950    Recent Hospitalizations:  No hospitalization(s) within the last year..        Current Medical Providers:  Patient Care Team:  Scott Gonzales MD as PCP - General (Internal Medicine)  Scott Gonzales MD as PCP - Internal Medicine  Reese Glover MD as PCP - Claims Attributed  Justin Acosta MD as Surgeon (Vascular Surgery)        Smoking Status:  History   Smoking Status   • Never Smoker   Smokeless Tobacco   • Never Used       Alcohol Consumption:  History   Alcohol Use   • Yes     Comment: Beer or wine occas       Depression Screen:   PHQ-2/PHQ-9 Depression Screening 5/31/2018   Little interest or pleasure in doing things 0   Feeling down, depressed, or hopeless 0   Trouble falling or staying asleep, or sleeping too much 0   Feeling tired or having little energy 0   Poor appetite or overeating 0   Feeling bad about yourself - or that you are a failure or have let yourself or your family down 0   Trouble concentrating on things, such as reading the newspaper or watching television 0   Moving or speaking so slowly that other people could have noticed. Or the opposite - being so fidgety or restless that you have been moving around a lot more than usual 0   Thoughts that you would be better off dead, or of hurting yourself in some way 0   Total Score 0   If you checked off any problems, how difficult have these problems made it for you to do your work, take care of things at home, or get along with other people? -       Health Habits and Functional and Cognitive Screening:  Functional & Cognitive Status 5/31/2018   Do you have difficulty preparing food and eating? No   Do you have difficulty bathing yourself, getting dressed or grooming yourself? No   Do you have difficulty using the toilet? No   Do you have difficulty moving around from place to place? No   Do you  have trouble with steps or getting out of a bed or a chair? No   In the past year have you fallen or experienced a near fall? No   Current Diet Well Balanced Diet   Dental Exam Not up to date   Eye Exam Not up to date   Exercise (times per week) 3 times per week   Current Exercise Activities Include Walking   Do you need help using the phone?  No   Are you deaf or do you have serious difficulty hearing?  No   Do you need help with transportation? No   Do you need help shopping? No   Do you need help preparing meals?  No   Do you need help with housework?  No   Do you need help with laundry? No   Do you need help taking your medications? No   Do you need help managing money? No   Do you ever drive or ride in a car without wearing a seat belt? No   Have you felt unusual stress, anger or loneliness in the last month? No   Who do you live with? Spouse   If you need help, do you have trouble finding someone available to you? No   Have you been bothered in the last four weeks by sexual problems? No   Do you have difficulty concentrating, remembering or making decisions? No           Does the patient have evidence of cognitive impairment? No    Aspirin use counseling: Does not need ASA (and currently is not on it)      Recent Lab Results:  CMP:  Lab Results   Component Value Date    GLU 93 06/27/2017    BUN 14 06/27/2017    CREATININE 0.92 06/27/2017    EGFRIFNONA 82 06/27/2017    EGFRIFAFRI 100 06/27/2017    BCR 15.2 06/27/2017     06/27/2017    K 4.4 06/27/2017    CO2 23.2 06/27/2017    CALCIUM 9.5 06/27/2017    PROTENTOTREF 6.9 06/27/2017    ALBUMIN 4.40 06/27/2017    LABGLOBREF 2.5 06/27/2017    LABIL2 1.8 06/27/2017    BILITOT 0.5 06/27/2017    ALKPHOS 50 06/27/2017    AST 19 06/27/2017    ALT 19 06/27/2017     Lipid Panel:  Lab Results   Component Value Date    TRIG 82 08/02/2016    HDL 86 08/02/2016    VLDL 16 08/02/2016     HbA1c:  Lab Results   Component Value Date    HGBA1C 5.50 06/27/2017       Visual  Acuity:  No exam data present    Age-appropriate Screening Schedule:  Refer to the list below for future screening recommendations based on patient's age, sex and/or medical conditions. Orders for these recommended tests are listed in the plan section. The patient has been provided with a written plan.    Health Maintenance   Topic Date Due   • ZOSTER VACCINE (1 of 2) 01/18/2001   • INFLUENZA VACCINE  08/01/2018   • PNEUMOCOCCAL VACCINES (65+ LOW/MEDIUM RISK) (2 of 2 - PPSV23) 11/17/2018   • COLONOSCOPY  01/01/2020   • TDAP/TD VACCINES (3 - Td) 10/18/2026        Subjective   History of Present Illness    Dennis De Anda is a 68 y.o. male who presents for an Subsequent Wellness Visit.    The following portions of the patient's history were reviewed and updated as appropriate: allergies, current medications, past family history, past medical history, past social history, past surgical history and problem list.    Outpatient Medications Prior to Visit   Medication Sig Dispense Refill   • apixaban (ELIQUIS) 2.5 MG tablet tablet Take 1 tablet by mouth 2 (Two) Times a Day. 180 tablet 3     No facility-administered medications prior to visit.        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    Review  "of Systems    Compared to one year ago, the patient feels his physical health is better.  Compared to one year ago, the patient feels his mental health is better.    Objective     Physical Exam    Vitals:    05/31/18 0755   BP: 128/64   BP Location: Left arm   Patient Position: Sitting   Pulse: 56   Resp: 16   Temp: 98.1 °F (36.7 °C)   SpO2: 97%   Weight: 78.1 kg (172 lb 3.2 oz)   Height: 177.8 cm (70\")   PainSc:   2       Patient's Body mass index is 24.71 kg/m². BMI is above normal parameters. Recommendations include: educational material and exercise counseling.      Assessment/Plan   Patient Self-Management and Personalized Health Advice  The patient has been provided with information about: exercise and preventive services including:   · Exercise counseling provided, Fall Risk assessment done.    Visit Diagnoses:  No diagnosis found.    No orders of the defined types were placed in this encounter.      Outpatient Encounter Prescriptions as of 5/31/2018   Medication Sig Dispense Refill   • apixaban (ELIQUIS) 2.5 MG tablet tablet Take 1 tablet by mouth 2 (Two) Times a Day. 180 tablet 3     No facility-administered encounter medications on file as of 5/31/2018.        Reviewed use of high risk medication in the elderly: yes  Reviewed for potential of harmful drug interactions in the elderly: yes    Follow Up:  No Follow-up on file.     An After Visit Summary and PPPS with all of these plans were given to the patient.         "

## 2018-11-29 ENCOUNTER — OFFICE VISIT (OUTPATIENT)
Dept: INTERNAL MEDICINE | Facility: CLINIC | Age: 68
End: 2018-11-29

## 2018-11-29 VITALS
HEIGHT: 70 IN | WEIGHT: 177 LBS | TEMPERATURE: 97.9 F | RESPIRATION RATE: 16 BRPM | BODY MASS INDEX: 25.34 KG/M2 | SYSTOLIC BLOOD PRESSURE: 126 MMHG | HEART RATE: 54 BPM | OXYGEN SATURATION: 97 % | DIASTOLIC BLOOD PRESSURE: 74 MMHG

## 2018-11-29 DIAGNOSIS — G47.33 OSA (OBSTRUCTIVE SLEEP APNEA): ICD-10-CM

## 2018-11-29 DIAGNOSIS — R73.02 IGT (IMPAIRED GLUCOSE TOLERANCE): ICD-10-CM

## 2018-11-29 DIAGNOSIS — K21.9 GASTROESOPHAGEAL REFLUX DISEASE WITHOUT ESOPHAGITIS: Primary | ICD-10-CM

## 2018-11-29 DIAGNOSIS — F41.9 ANXIETY: ICD-10-CM

## 2018-11-29 DIAGNOSIS — I26.99 PE (PULMONARY THROMBOEMBOLISM) (HCC): ICD-10-CM

## 2018-11-29 PROCEDURE — 99213 OFFICE O/P EST LOW 20 MIN: CPT | Performed by: INTERNAL MEDICINE

## 2018-11-29 RX ORDER — AZITHROMYCIN 500 MG/1
1 TABLET, FILM COATED ORAL DAILY
COMMUNITY
Start: 2018-11-19 | End: 2018-11-29

## 2018-11-29 RX ORDER — CEPHALEXIN 500 MG/1
500 CAPSULE ORAL 2 TIMES DAILY
Qty: 28 CAPSULE | Refills: 0 | Status: SHIPPED | OUTPATIENT
Start: 2018-11-29 | End: 2019-05-30

## 2018-11-29 RX ORDER — ATOVAQUONE AND PROGUANIL HYDROCHLORIDE 250; 100 MG/1; MG/1
1 TABLET, FILM COATED ORAL DAILY
COMMUNITY
Start: 2018-11-19 | End: 2018-11-29

## 2018-11-29 NOTE — PROGRESS NOTES
Subjective   Dennis De Anda is a 68 y.o. male.     Chief Complaint   Patient presents with   • Hypertension   • Heartburn   • Cancer     Prostate cancer         In for recheck of DVT and PE.  Just a few small peripheral PEs on CT scan.  Extensive DVT left leg which was proximal femoral lead to thrombolysis and embolectomy.        Deep Vein Thrombosis   This is a chronic problem. The current episode started more than 1 month ago. The problem has been gradually improving. Pertinent negatives include no chest pain, chills, coughing, fatigue or fever. The symptoms are aggravated by standing.   Heartburn   He reports no chest pain, no coughing or no wheezing. Pertinent negatives include no fatigue.   Anemia   Presents for follow-up visit. There has been no fever.   Hyperglycemia   This is a chronic problem. The current episode started more than 1 year ago. The problem occurs constantly. The problem has been unchanged. Pertinent negatives include no chest pain, chills, coughing, fatigue or fever.        The following portions of the patient's history were reviewed and updated as appropriate: allergies, current medications, past social history and problem list.    Outpatient Medications Marked as Taking for the 11/29/18 encounter (Office Visit) with Scott Gonzales MD   Medication Sig Dispense Refill   • apixaban (ELIQUIS) 2.5 MG tablet tablet Take 1 tablet by mouth 2 (Two) Times a Day. 180 tablet 3       Review of Systems   Constitutional: Negative for chills, fatigue and fever.   Respiratory: Negative for cough, shortness of breath and wheezing.    Cardiovascular: Positive for leg swelling. Negative for chest pain.       Objective   Vitals:    11/29/18 0758   BP: 126/74   Pulse: 54   Resp: 16   Temp: 97.9 °F (36.6 °C)   SpO2: 97%          11/29/18  0758   Weight: 80.3 kg (177 lb)    [unfilled]  Body mass index is 25.4 kg/m².      Physical Exam   Constitutional: He appears well-developed and well-nourished.    Cardiovascular: Normal rate, regular rhythm, normal heart sounds and intact distal pulses.   Pulmonary/Chest: Effort normal and breath sounds normal.         Problem List Items Addressed This Visit        Cardiovascular and Mediastinum    PE (pulmonary thromboembolism) (CMS/HCC)       Respiratory    TRIXIE (obstructive sleep apnea)       Digestive    Gastroesophageal reflux disease without esophagitis - Primary       Endocrine    IGT (impaired glucose tolerance)       Other    Anxiety        Assessment/Plan   In for recheck of DVT and PE.  Small peripheral PE but extensive DVT left leg.  Had thrombolysis and embolectomy.  The DVT occurred following plane ride to [place] followed 3 weeks later by a road trip/car ride to [facility].  He has completed 18 months of eliquis treatment but will plan to continue lifelong low dose eliquis at this point given the severity of his original DVT.  He's using a support hose.  Not wearing the support hose much in warm weather since he's wearing shorts.  We will recheck in 6 months.  Greater than 15 minutes spent with patient, over 50% counseling him on his dvt.  Annual lab work May 2018.  Jared is due.  Traveling to [place] in 1 month or so.  Shots up-to-date.  We'll give a prescription for Keflex to take on the trip for when necessary use.         Dragon disclaimer:   Much of this encounter note is an electronic transcription/translation of spoken language to printed text. The electronic translation of spoken language may permit erroneous, or at times, nonsensical words or phrases to be inadvertently transcribed; Although I have reviewed the note for such errors, some may still exist.

## 2019-05-03 RX ORDER — APIXABAN 2.5 MG/1
TABLET, FILM COATED ORAL
Qty: 180 TABLET | Refills: 0 | Status: SHIPPED | OUTPATIENT
Start: 2019-05-03 | End: 2019-08-12 | Stop reason: SDUPTHER

## 2019-05-30 ENCOUNTER — OFFICE VISIT (OUTPATIENT)
Dept: INTERNAL MEDICINE | Facility: CLINIC | Age: 69
End: 2019-05-30

## 2019-05-30 VITALS
HEIGHT: 70 IN | RESPIRATION RATE: 16 BRPM | HEART RATE: 68 BPM | BODY MASS INDEX: 25.05 KG/M2 | SYSTOLIC BLOOD PRESSURE: 116 MMHG | TEMPERATURE: 97.8 F | DIASTOLIC BLOOD PRESSURE: 72 MMHG | WEIGHT: 175 LBS | OXYGEN SATURATION: 91 %

## 2019-05-30 DIAGNOSIS — R73.02 IGT (IMPAIRED GLUCOSE TOLERANCE): ICD-10-CM

## 2019-05-30 DIAGNOSIS — K21.9 GASTROESOPHAGEAL REFLUX DISEASE WITHOUT ESOPHAGITIS: ICD-10-CM

## 2019-05-30 DIAGNOSIS — I26.99 PE (PULMONARY THROMBOEMBOLISM) (HCC): Primary | ICD-10-CM

## 2019-05-30 DIAGNOSIS — Z79.899 MEDICATION MANAGEMENT: ICD-10-CM

## 2019-05-30 DIAGNOSIS — G47.33 OSA (OBSTRUCTIVE SLEEP APNEA): ICD-10-CM

## 2019-05-30 DIAGNOSIS — C61 PROSTATE CANCER (HCC): ICD-10-CM

## 2019-05-30 LAB
ALBUMIN SERPL-MCNC: 4.5 G/DL (ref 3.5–5.2)
ALBUMIN/GLOB SERPL: 2.1 G/DL
ALP SERPL-CCNC: 56 U/L (ref 39–117)
ALT SERPL-CCNC: 17 U/L (ref 1–41)
AST SERPL-CCNC: 22 U/L (ref 1–40)
BASOPHILS # BLD AUTO: 0.02 10*3/MM3 (ref 0–0.2)
BASOPHILS NFR BLD AUTO: 0.4 % (ref 0–1.5)
BILIRUB SERPL-MCNC: 0.4 MG/DL (ref 0.2–1.2)
BUN SERPL-MCNC: 15 MG/DL (ref 8–23)
BUN/CREAT SERPL: 15.2 (ref 7–25)
CALCIUM SERPL-MCNC: 9.6 MG/DL (ref 8.6–10.5)
CHLORIDE SERPL-SCNC: 105 MMOL/L (ref 98–107)
CHOLEST SERPL-MCNC: 221 MG/DL (ref 0–200)
CO2 SERPL-SCNC: 25.9 MMOL/L (ref 22–29)
CREAT SERPL-MCNC: 0.99 MG/DL (ref 0.76–1.27)
EOSINOPHIL # BLD AUTO: 0.08 10*3/MM3 (ref 0–0.4)
EOSINOPHIL NFR BLD AUTO: 1.8 % (ref 0.3–6.2)
ERYTHROCYTE [DISTWIDTH] IN BLOOD BY AUTOMATED COUNT: 13.2 % (ref 12.3–15.4)
GLOBULIN SER CALC-MCNC: 2.1 GM/DL
GLUCOSE SERPL-MCNC: 96 MG/DL (ref 65–99)
HBA1C MFR BLD: 5.5 % (ref 4.8–5.6)
HCT VFR BLD AUTO: 44.8 % (ref 37.5–51)
HDLC SERPL-MCNC: 75 MG/DL (ref 40–60)
HGB BLD-MCNC: 13.6 G/DL (ref 13–17.7)
IMM GRANULOCYTES # BLD AUTO: 0.01 10*3/MM3 (ref 0–0.05)
IMM GRANULOCYTES NFR BLD AUTO: 0.2 % (ref 0–0.5)
LDLC SERPL CALC-MCNC: 132 MG/DL (ref 0–100)
LYMPHOCYTES # BLD AUTO: 1.44 10*3/MM3 (ref 0.7–3.1)
LYMPHOCYTES NFR BLD AUTO: 31.7 % (ref 19.6–45.3)
MCH RBC QN AUTO: 30 PG (ref 26.6–33)
MCHC RBC AUTO-ENTMCNC: 30.4 G/DL (ref 31.5–35.7)
MCV RBC AUTO: 98.9 FL (ref 79–97)
MONOCYTES # BLD AUTO: 0.57 10*3/MM3 (ref 0.1–0.9)
MONOCYTES NFR BLD AUTO: 12.6 % (ref 5–12)
NEUTROPHILS # BLD AUTO: 2.42 10*3/MM3 (ref 1.7–7)
NEUTROPHILS NFR BLD AUTO: 53.3 % (ref 42.7–76)
NRBC BLD AUTO-RTO: 0 /100 WBC (ref 0–0.2)
PLATELET # BLD AUTO: 168 10*3/MM3 (ref 140–450)
POTASSIUM SERPL-SCNC: 4.6 MMOL/L (ref 3.5–5.2)
PROT SERPL-MCNC: 6.6 G/DL (ref 6–8.5)
PSA SERPL-MCNC: 0.07 NG/ML (ref 0–4)
RBC # BLD AUTO: 4.53 10*6/MM3 (ref 4.14–5.8)
SODIUM SERPL-SCNC: 142 MMOL/L (ref 136–145)
TRIGL SERPL-MCNC: 71 MG/DL (ref 0–150)
VLDLC SERPL CALC-MCNC: 14.2 MG/DL
WBC # BLD AUTO: 4.54 10*3/MM3 (ref 3.4–10.8)

## 2019-05-30 PROCEDURE — 99214 OFFICE O/P EST MOD 30 MIN: CPT | Performed by: INTERNAL MEDICINE

## 2019-05-30 NOTE — PATIENT INSTRUCTIONS
Exercising to Stay Healthy  Exercising regularly is important. It has many health benefits, such as:  · Improving your overall fitness, flexibility, and endurance.  · Increasing your bone density.  · Helping with weight control.  · Decreasing your body fat.  · Increasing your muscle strength.  · Reducing stress and tension.  · Improving your overall health.    In order to become healthy and stay healthy, it is recommended that you do moderate-intensity and vigorous-intensity exercise. You can tell that you are exercising at a moderate intensity if you have a higher heart rate and faster breathing, but you are still able to hold a conversation. You can tell that you are exercising at a vigorous intensity if you are breathing much harder and faster and cannot hold a conversation while exercising.  How often should I exercise?  Choose an activity that you enjoy and set realistic goals. Your health care provider can help you to make an activity plan that works for you. Exercise regularly as directed by your health care provider. This may include:  · Doing resistance training twice each week, such as:  ? Push-ups.  ? Sit-ups.  ? Lifting weights.  ? Using resistance bands.  · Doing a given intensity of exercise for a given amount of time. Choose from these options:  ? 150 minutes of moderate-intensity exercise every week.  ? 75 minutes of vigorous-intensity exercise every week.  ? A mix of moderate-intensity and vigorous-intensity exercise every week.    Children, pregnant women, people who are out of shape, people who are overweight, and older adults may need to consult a health care provider for individual recommendations. If you have any sort of medical condition, be sure to consult your health care provider before starting a new exercise program.  What are some exercise ideas?  Some moderate-intensity exercise ideas include:  · Walking at a rate of 1 mile in 15  minutes.  · Biking.  · Hiking.  · Golfing.  · Dancing.    Some vigorous-intensity exercise ideas include:  · Walking at a rate of at least 4.5 miles per hour.  · Jogging or running at a rate of 5 miles per hour.  · Biking at a rate of at least 10 miles per hour.  · Lap swimming.  · Roller-skating or in-line skating.  · Cross-country skiing.  · Vigorous competitive sports, such as football, basketball, and soccer.  · Jumping rope.  · Aerobic dancing.    What are some everyday activities that can help me to get exercise?  · Yard work, such as:  ? Pushing a .  ? Raking and bagging leaves.  · Washing and waxing your car.  · Pushing a stroller.  · Shoveling snow.  · Gardening.  · Washing windows or floors.  How can I be more active in my day-to-day activities?  · Use the stairs instead of the elevator.  · Take a walk during your lunch break.  · If you drive, park your car farther away from work or school.  · If you take public transportation, get off one stop early and walk the rest of the way.  · Make all of your phone calls while standing up and walking around.  · Get up, stretch, and walk around every 30 minutes throughout the day.  What guidelines should I follow while exercising?  · Do not exercise so much that you hurt yourself, feel dizzy, or get very short of breath.  · Consult your health care provider before starting a new exercise program.  · Wear comfortable clothes and shoes with good support.  · Drink plenty of water while you exercise to prevent dehydration or heat stroke. Body water is lost during exercise and must be replaced.  · Work out until you breathe faster and your heart beats faster.  This information is not intended to replace advice given to you by your health care provider. Make sure you discuss any questions you have with your health care provider.  Document Released: 01/20/2012 Document Revised: 05/25/2017 Document Reviewed: 05/21/2015  [employer] Interactive Patient Education © 2018  [employer]  Calorie Counting for Weight Loss  Calories are units of energy. Your body needs a certain amount of calories from food to keep you going throughout the day. When you eat more calories than your body needs, your body stores the extra calories as fat. When you eat fewer calories than your body needs, your body burns fat to get the energy it needs.  Calorie counting means keeping track of how many calories you eat and drink each day. Calorie counting can be helpful if you need to lose weight. If you make sure to eat fewer calories than your body needs, you should lose weight. Ask your health care provider what a healthy weight is for you.  For calorie counting to work, you will need to eat the right number of calories in a day in order to lose a healthy amount of weight per week. A dietitian can help you determine how many calories you need in a day and will give you suggestions on how to reach your calorie goal.  · A healthy amount of weight to lose per week is usually 1-2 lb (0.5-0.9 kg). This usually means that your daily calorie intake should be reduced by 500-750 calories.  · Eating 1,200 - 1,500 calories per day can help most women lose weight.  · Eating 1,500 - 1,800 calories per day can help most men lose weight.    What is my plan?  My goal is to have __________ calories per day.  If I have this many calories per day, I should lose around __________ pounds per week.  What do I need to know about calorie counting?  In order to meet your daily calorie goal, you will need to:  · Find out how many calories are in each food you would like to eat. Try to do this before you eat.  · Decide how much of the food you plan to eat.  · Write down what you ate and how many calories it had. Doing this is called keeping a food log.    To successfully lose weight, it is important to balance calorie counting with a healthy lifestyle that includes regular activity. Aim for 150 minutes of moderate exercise (such as  walking) or 75 minutes of vigorous exercise (such as running) each week.  Where do I find calorie information?    The number of calories in a food can be found on a Nutrition Facts label. If a food does not have a Nutrition Facts label, try to look up the calories online or ask your dietitian for help.  Remember that calories are listed per serving. If you choose to have more than one serving of a food, you will have to multiply the calories per serving by the amount of servings you plan to eat. For example, the label on a package of bread might say that a serving size is 1 slice and that there are 90 calories in a serving. If you eat 1 slice, you will have eaten 90 calories. If you eat 2 slices, you will have eaten 180 calories.  How do I keep a food log?  Immediately after each meal, record the following information in your food log:  · What you ate. Don't forget to include toppings, sauces, and other extras on the food.  · How much you ate. This can be measured in cups, ounces, or number of items.  · How many calories each food and drink had.  · The total number of calories in the meal.    Keep your food log near you, such as in a small notebook in your pocket, or use a mobile tiffanie or website. Some programs will calculate calories for you and show you how many calories you have left for the day to meet your goal.  What are some calorie counting tips?  · Use your calories on foods and drinks that will fill you up and not leave you hungry:  ? Some examples of foods that fill you up are nuts and nut butters, vegetables, lean proteins, and high-fiber foods like whole grains. High-fiber foods are foods with more than 5 g fiber per serving.  ? Drinks such as sodas, specialty coffee drinks, alcohol, and juices have a lot of calories, yet do not fill you up.  · Eat nutritious foods and avoid empty calories. Empty calories are calories you get from foods or beverages that do not have many vitamins or protein, such as  "candy, sweets, and soda. It is better to have a nutritious high-calorie food (such as an avocado) than a food with few nutrients (such as a bag of chips).  · Know how many calories are in the foods you eat most often. This will help you calculate calorie counts faster.  · Pay attention to calories in drinks. Low-calorie drinks include water and unsweetened drinks.  · Pay attention to nutrition labels for \"low fat\" or \"fat free\" foods. These foods sometimes have the same amount of calories or more calories than the full fat versions. They also often have added sugar, starch, or salt, to make up for flavor that was removed with the fat.  · Find a way of tracking calories that works for you. Get creative. Try different apps or programs if writing down calories does not work for you.  What are some portion control tips?  · Know how many calories are in a serving. This will help you know how many servings of a certain food you can have.  · Use a measuring cup to measure serving sizes. You could also try weighing out portions on a kitchen scale. With time, you will be able to estimate serving sizes for some foods.  · Take some time to put servings of different foods on your favorite plates, bowls, and cups so you know what a serving looks like.  · Try not to eat straight from a bag or box. Doing this can lead to overeating. Put the amount you would like to eat in a cup or on a plate to make sure you are eating the right portion.  · Use smaller plates, glasses, and bowls to prevent overeating.  · Try not to multitask (for example, watch TV or use your computer) while eating. If it is time to eat, sit down at a table and enjoy your food. This will help you to know when you are full. It will also help you to be aware of what you are eating and how much you are eating.  What are tips for following this plan?  Reading food labels  · Check the calorie count compared to the serving size. The serving size may be smaller than what " you are used to eating.  · Check the source of the calories. Make sure the food you are eating is high in vitamins and protein and low in saturated and trans fats.  Shopping  · Read nutrition labels while you shop. This will help you make healthy decisions before you decide to purchase your food.  · Make a grocery list and stick to it.  Cooking  · Try to cook your favorite foods in a healthier way. For example, try baking instead of frying.  · Use low-fat dairy products.  Meal planning  · Use more fruits and vegetables. Half of your plate should be fruits and vegetables.  · Include lean proteins like poultry and fish.  How do I count calories when eating out?  · Ask for smaller portion sizes.  · Consider sharing an entree and sides instead of getting your own entree.  · If you get your own entree, eat only half. Ask for a box at the beginning of your meal and put the rest of your entree in it so you are not tempted to eat it.  · If calories are listed on the menu, choose the lower calorie options.  · Choose dishes that include vegetables, fruits, whole grains, low-fat dairy products, and lean protein.  · Choose items that are boiled, broiled, grilled, or steamed. Stay away from items that are buttered, battered, fried, or served with cream sauce. Items labeled “crispy” are usually fried, unless stated otherwise.  · Choose water, low-fat milk, unsweetened iced tea, or other drinks without added sugar. If you want an alcoholic beverage, choose a lower calorie option such as a glass of wine or light beer.  · Ask for dressings, sauces, and syrups on the side. These are usually high in calories, so you should limit the amount you eat.  · If you want a salad, choose a garden salad and ask for grilled meats. Avoid extra toppings like gonzalez, cheese, or fried items. Ask for the dressing on the side, or ask for olive oil and vinegar or lemon to use as dressing.  · Estimate how many servings of a food you are given. For  example, a serving of cooked rice is ½ cup or about the size of half a baseball. Knowing serving sizes will help you be aware of how much food you are eating at restaurants. The list below tells you how big or small some common portion sizes are based on everyday objects:  ? 1 oz--4 stacked dice.  ? 3 oz--1 deck of cards.  ? 1 tsp--1 die.  ? 1 Tbsp--½ a ping-pong ball.  ? 2 Tbsp--1 ping-pong ball.  ? ½ cup--½ baseball.  ? 1 cup--1 baseball.  Summary  · Calorie counting means keeping track of how many calories you eat and drink each day. If you eat fewer calories than your body needs, you should lose weight.  · A healthy amount of weight to lose per week is usually 1-2 lb (0.5-0.9 kg). This usually means reducing your daily calorie intake by 500-750 calories.  · The number of calories in a food can be found on a Nutrition Facts label. If a food does not have a Nutrition Facts label, try to look up the calories online or ask your dietitian for help.  · Use your calories on foods and drinks that will fill you up, and not on foods and drinks that will leave you hungry.  · Use smaller plates, glasses, and bowls to prevent overeating.  This information is not intended to replace advice given to you by your health care provider. Make sure you discuss any questions you have with your health care provider.  Document Released: 12/18/2006 Document Revised: 11/17/2017 Document Reviewed: 11/17/2017  [employer] Patient Education © 2019 [employer]  BMI for Adults  Body mass index (BMI) is a number that is calculated from a person's weight and height. In most adults, the number is used to find how much of an adult's weight is made up of fat. BMI is not as accurate as a direct measure of body fat.  How is BMI calculated?  BMI is calculated by dividing weight in kilograms by height in meters squared. It can also be calculated by dividing weight in pounds by height in inches squared, then multiplying the resulting number  by 703. Charts are available to help you find your BMI quickly and easily without doing this calculation.  How is BMI interpreted?  Health care professionals use BMI charts to identify whether an adult is underweight, at a normal weight, or overweight based on the following guidelines:  · Underweight: BMI less than 18.5.  · Normal weight: BMI between 18.5 and 24.9.  · Overweight: BMI between 25 and 29.9.  · Obese: BMI of 30 and above.    BMI is usually interpreted the same for males and females.  Weight includes both fat and muscle, so someone with a muscular build, such as an athlete, may have a BMI that is higher than 24.9. In cases like these, BMI may not accurately depict body fat. To determine if excess body fat is the cause of a BMI of 25 or higher, further assessments may need to be done by a health care provider.  Why is BMI a useful tool?  BMI is used to identify a possible weight problem that may be related to a medical problem or may increase the risk for medical problems. BMI can also be used to promote changes to reach a healthy weight.  This information is not intended to replace advice given to you by your health care provider. Make sure you discuss any questions you have with your health care provider.  Document Released: 08/29/2005 Document Revised: 04/27/2017 Document Reviewed: 05/15/2015  [employer] Interactive Patient Education © 2018 [employer]

## 2019-05-30 NOTE — PROGRESS NOTES
Subjective   Dennis De Anda is a 69 y.o. male.     Chief Complaint   Patient presents with   • Deep Vein Thrombosis   • Sleep Apnea   • Hyperglycemia         In for recheck of DVT and PE.  Just a few small peripheral PEs on CT scan.  Extensive DVT left leg which was proximal femoral lead to thrombolysis and embolectomy.        Deep Vein Thrombosis   This is a chronic problem. The current episode started more than 1 month ago. The problem has been gradually improving. Pertinent negatives include no chest pain, chills, coughing, fatigue or fever. The symptoms are aggravated by standing.   Sleep Apnea   This is a chronic problem. The current episode started more than 1 year ago. Pertinent negatives include no chest pain, chills, coughing, fatigue or fever.   Hyperglycemia   This is a chronic problem. The current episode started more than 1 year ago. The problem occurs constantly. The problem has been unchanged. Pertinent negatives include no chest pain, chills, coughing, fatigue or fever.   Heartburn   He reports no chest pain, no coughing or no wheezing. This is a chronic problem. The current episode started more than 1 year ago. The problem has been unchanged. Pertinent negatives include no fatigue.   Anemia   Presents for follow-up visit. There has been no fever.        The following portions of the patient's history were reviewed and updated as appropriate: allergies, current medications, past social history and problem list.    Outpatient Medications Marked as Taking for the 5/30/19 encounter (Office Visit) with Scott Gonzales MD   Medication Sig Dispense Refill   • ELIQUIS 2.5 MG tablet tablet TAKE 1 TABLET BY MOUTH TWICE DAILY 180 tablet 0       Review of Systems   Constitutional: Negative for chills, fatigue and fever.   Respiratory: Negative for cough, shortness of breath and wheezing.    Cardiovascular: Positive for leg swelling. Negative for chest pain.       Objective   Vitals:    05/30/19 0905   BP:  116/72   Pulse: 68   Resp: 16   Temp: 97.8 °F (36.6 °C)   SpO2: 91%          05/30/19  0905   Weight: 79.4 kg (175 lb)    [unfilled]  Body mass index is 25.11 kg/m².      Physical Exam   Constitutional: He appears well-developed and well-nourished.   Cardiovascular: Normal rate, regular rhythm, normal heart sounds and intact distal pulses.   Pulmonary/Chest: Effort normal and breath sounds normal.         Problem List Items Addressed This Visit        Cardiovascular and Mediastinum    PE (pulmonary thromboembolism) (CMS/HCC) - Primary       Respiratory    TRIXIE (obstructive sleep apnea)       Digestive    Gastroesophageal reflux disease without esophagitis       Endocrine    IGT (impaired glucose tolerance)        Assessment/Plan   In for recheck of DVT and PE.  Small peripheral PE but extensive DVT left leg.  Had thrombolysis and embolectomy.  The DVT occurred following plane ride to [place] followed 3 weeks later by a road trip/car ride to [facility].  He has completed 18 months of eliquis treatment but will plan to continue lifelong low dose eliquis at this point given the severity of his original DVT.  He's using a support hose.  Not wearing the support hose much in warm weather since he's wearing shorts.  We will recheck in 6 months.  Greater than 15 minutes spent with patient, over 50% counseling him on his dvt.  Annual lab work May 2018.  Jared is due.           Dragon disclaimer:   Much of this encounter note is an electronic transcription/translation of spoken language to printed text. The electronic translation of spoken language may permit erroneous, or at times, nonsensical words or phrases to be inadvertently transcribed; Although I have reviewed the note for such errors, some may still exist.

## 2019-08-13 RX ORDER — APIXABAN 2.5 MG/1
TABLET, FILM COATED ORAL
Qty: 180 TABLET | Refills: 0 | Status: SHIPPED | OUTPATIENT
Start: 2019-08-13 | End: 2019-11-11 | Stop reason: SDUPTHER

## 2019-11-11 RX ORDER — APIXABAN 2.5 MG/1
TABLET, FILM COATED ORAL
Qty: 180 TABLET | Refills: 1 | Status: SHIPPED | OUTPATIENT
Start: 2019-11-11 | End: 2020-05-27

## 2019-11-25 ENCOUNTER — OFFICE VISIT (OUTPATIENT)
Dept: INTERNAL MEDICINE | Facility: CLINIC | Age: 69
End: 2019-11-25

## 2019-11-25 VITALS
HEIGHT: 70 IN | TEMPERATURE: 97.7 F | SYSTOLIC BLOOD PRESSURE: 132 MMHG | BODY MASS INDEX: 25.62 KG/M2 | OXYGEN SATURATION: 93 % | HEART RATE: 58 BPM | RESPIRATION RATE: 16 BRPM | WEIGHT: 179 LBS | DIASTOLIC BLOOD PRESSURE: 74 MMHG

## 2019-11-25 DIAGNOSIS — I82.412 ACUTE DEEP VEIN THROMBOSIS (DVT) OF FEMORAL VEIN OF LEFT LOWER EXTREMITY (HCC): ICD-10-CM

## 2019-11-25 DIAGNOSIS — M17.0 PRIMARY OSTEOARTHRITIS OF BOTH KNEES: ICD-10-CM

## 2019-11-25 DIAGNOSIS — R73.02 IGT (IMPAIRED GLUCOSE TOLERANCE): Primary | ICD-10-CM

## 2019-11-25 PROCEDURE — 99214 OFFICE O/P EST MOD 30 MIN: CPT | Performed by: INTERNAL MEDICINE

## 2019-11-25 PROCEDURE — G0439 PPPS, SUBSEQ VISIT: HCPCS | Performed by: INTERNAL MEDICINE

## 2019-11-25 NOTE — PROGRESS NOTES
Subjective   Dennis De Anda is a 69 y.o. male.     Chief Complaint   Patient presents with   • Heartburn   • Hyperglycemia   • Deep Vein Thrombosis         In for recheck of DVT and PE.  Just a few small peripheral PEs on CT scan.  Extensive DVT left leg which was proximal femoral lead to thrombolysis and embolectomy.        Heartburn   He reports no chest pain, no coughing or no wheezing. This is a chronic problem. The current episode started more than 1 year ago. The problem has been unchanged. Pertinent negatives include no fatigue.   Hyperglycemia   This is a chronic problem. The current episode started more than 1 year ago. The problem occurs constantly. The problem has been unchanged. Pertinent negatives include no chest pain, chills, coughing, fatigue or fever.   Deep Vein Thrombosis   This is a chronic problem. The current episode started more than 1 month ago. The problem has been gradually improving. Pertinent negatives include no chest pain, chills, coughing, fatigue or fever. The symptoms are aggravated by standing.   Sleep Apnea   This is a chronic problem. The current episode started more than 1 year ago. Pertinent negatives include no chest pain, chills, coughing, fatigue or fever.   Anemia   Presents for follow-up visit. There has been no fever.        The following portions of the patient's history were reviewed and updated as appropriate: allergies, current medications, past social history and problem list.    Outpatient Medications Marked as Taking for the 11/25/19 encounter (Office Visit) with Scott Gonzales MD   Medication Sig Dispense Refill   • ELIQUIS 2.5 MG tablet tablet TAKE 1 TABLET BY MOUTH TWICE DAILY 180 tablet 1       Review of Systems   Constitutional: Negative for chills, fatigue and fever.   Respiratory: Negative for cough, shortness of breath and wheezing.    Cardiovascular: Positive for leg swelling. Negative for chest pain.       Objective   Vitals:    11/25/19 0905   BP:  132/74   Pulse: 58   Resp: 16   Temp: 97.7 °F (36.5 °C)   SpO2: 93%          11/25/19  0905   Weight: 81.2 kg (179 lb)    [unfilled]  Body mass index is 25.68 kg/m².      Physical Exam   Constitutional: He appears well-developed and well-nourished.   Cardiovascular: Normal rate, regular rhythm, normal heart sounds and intact distal pulses.   Pulmonary/Chest: Effort normal and breath sounds normal.         Problem List Items Addressed This Visit        Cardiovascular and Mediastinum    Acute deep vein thrombosis (DVT) of femoral vein of left lower extremity (CMS/HCC)       Endocrine    IGT (impaired glucose tolerance) - Primary       Musculoskeletal and Integument    OA (osteoarthritis)        Assessment/Plan   In for recheck of DVT and PE.  Small peripheral PE but extensive DVT left leg.  Had thrombolysis and embolectomy.  The DVT occurred following plane ride to [place] followed 3 weeks later by a road trip/car ride to [facility].  He has completed 18 months of eliquis treatment but will plan to continue lifelong low dose eliquis at this point given the severity of his original DVT.  He's using a support hose.  Not wearing the support hose much in warm weather.  We will recheck in 6 months.  Annual lab work May 2019.  Annual wellness visit today.         Dragon disclaimer:   Much of this encounter note is an electronic transcription/translation of spoken language to printed text. The electronic translation of spoken language may permit erroneous, or at times, nonsensical words or phrases to be inadvertently transcribed; Although I have reviewed the note for such errors, some may still exist.

## 2019-11-25 NOTE — PROGRESS NOTES
The ABCs of the Annual Wellness Visit  Subsequent Medicare Wellness Visit    Chief Complaint   Patient presents with   • Heartburn   • Hyperglycemia   • Deep Vein Thrombosis       Subjective   History of Present Illness:  Dennis De Anda is a 69 y.o. male who presents for a Subsequent Medicare Wellness Visit.    HEALTH RISK ASSESSMENT    Recent Hospitalizations:  No hospitalization(s) within the last year.    Current Medical Providers:  Patient Care Team:  Scott Gonzales MD as PCP - General (Internal Medicine)  Scott Gonzales MD as PCP - Internal Medicine  Scott Gonzales MD as PCP - [facility]  Justin Acosta MD as Surgeon (Vascular Surgery)    Smoking Status:  Social History     Tobacco Use   Smoking Status Never Smoker   Smokeless Tobacco Never Used       Alcohol Consumption:  Social History     Substance and Sexual Activity   Alcohol Use Yes   • Frequency: 4 or more times a week   • Drinks per session: 1 or 2   • Binge frequency: Never    Comment: Beer or wine occas       Depression Screen:   PHQ-2/PHQ-9 Depression Screening 11/25/2019   Little interest or pleasure in doing things 0   Feeling down, depressed, or hopeless 0   Trouble falling or staying asleep, or sleeping too much 0   Feeling tired or having little energy 0   Poor appetite or overeating 0   Feeling bad about yourself - or that you are a failure or have let yourself or your family down 0   Trouble concentrating on things, such as reading the newspaper or watching television 0   Moving or speaking so slowly that other people could have noticed. Or the opposite - being so fidgety or restless that you have been moving around a lot more than usual 0   Thoughts that you would be better off dead, or of hurting yourself in some way 0   Total Score 0   If you checked off any problems, how difficult have these problems made it for you to do your work, take care of things at home, or get along with other people? Not difficult at all       Fall  Risk Screen:  FITZADI Fall Risk Assessment was completed, and patient is at LOW risk for falls.Assessment completed on:11/25/2019    Health Habits and Functional and Cognitive Screening:  Functional & Cognitive Status 11/25/2019   Do you have difficulty preparing food and eating? No   Do you have difficulty bathing yourself, getting dressed or grooming yourself? No   Do you have difficulty using the toilet? No   Do you have difficulty moving around from place to place? No   Do you have trouble with steps or getting out of a bed or a chair? No   Current Diet Well Balanced Diet   Dental Exam Up to date   Eye Exam Up to date   Exercise (times per week) 5 times per week   Current Exercise Activities Include Walking   Do you need help using the phone?  No   Are you deaf or do you have serious difficulty hearing?  No   Do you need help with transportation? No   Do you need help shopping? No   Do you need help preparing meals?  No   Do you need help with housework?  No   Do you need help with laundry? No   Do you need help taking your medications? No   Do you need help managing money? No   Do you ever drive or ride in a car without wearing a seat belt? No   Have you felt unusual stress, anger or loneliness in the last month? No   Who do you live with? Spouse   If you need help, do you have trouble finding someone available to you? No   Have you been bothered in the last four weeks by sexual problems? No   Do you have difficulty concentrating, remembering or making decisions? No         Does the patient have evidence of cognitive impairment? No    Asprin use counseling:Does not need ASA (and currently is not on it)    Age-appropriate Screening Schedule:  Refer to the list below for future screening recommendations based on patient's age, sex and/or medical conditions. Orders for these recommended tests are listed in the plan section. The patient has been provided with a written plan.    Health Maintenance   Topic Date Due  "  • ZOSTER VACCINE (2 of 2) 11/08/2019   • COLONOSCOPY  01/01/2020   • TDAP/TD VACCINES (3 - Td) 10/18/2026   • INFLUENZA VACCINE  Completed   • PNEUMOCOCCAL VACCINES (65+ LOW/MEDIUM RISK)  Completed          The following portions of the patient's history were reviewed and updated as appropriate: allergies, current medications, past family history, past medical history, past social history, past surgical history and problem list.    Outpatient Medications Prior to Visit   Medication Sig Dispense Refill   • ELIQUIS 2.5 MG tablet tablet TAKE 1 TABLET BY MOUTH TWICE DAILY 180 tablet 1     No facility-administered medications prior to visit.        Patient Active Problem List   Diagnosis   • IGT (impaired glucose tolerance)   • MVP (mitral valve prolapse)   • DDD (degenerative disc disease), lumbar   • Prostate cancer (CMS/HCC)   • Peyronie disease   • Angiodysplasia   • TRIXIE (obstructive sleep apnea)   • Vitamin D deficiency   • OA (osteoarthritis)   • PE (pulmonary thromboembolism) (CMS/[facility])   • Acute deep vein thrombosis (DVT) of femoral vein of left lower extremity (CMS/[facility])   • DJD (degenerative joint disease) of knee   • PVC (premature ventricular contraction)   • Anxiety   • 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  Compared to one year ago, the patient feels his mental health is the same.    Reviewed chart for potential of high risk medication in the elderly: yes  Reviewed chart for potential of harmful drug interactions in the elderly:yes    Objective         Vitals:    11/25/19 0905   BP: 132/74   Pulse: 58   Resp: 16   Temp: 97.7 °F (36.5 °C)   TempSrc: Oral   SpO2: 93%   Weight: 81.2 kg (179 lb)   Height: 177.8 cm (70\") "   PainSc: 0-No pain       Body mass index is 25.68 kg/m².  Discussed the patient's BMI with him. The BMI is in the acceptable range.    Physical Exam          Assessment/Plan   Medicare Risks and Personalized Health Plan  CMS Preventative Services Quick Reference  Fall Risk    The above risks/problems have been discussed with the patient.  Pertinent information has been shared with the patient in the After Visit Summary.  Follow up plans and orders are seen below in the Assessment/Plan Section.    There are no diagnoses linked to this encounter.  Follow Up:  No Follow-up on file.     An After Visit Summary and PPPS were given to the patient.

## 2019-11-25 NOTE — PATIENT INSTRUCTIONS

## 2020-05-15 ENCOUNTER — OFFICE VISIT (OUTPATIENT)
Dept: INTERNAL MEDICINE | Facility: CLINIC | Age: 70
End: 2020-05-15

## 2020-05-15 DIAGNOSIS — C61 PROSTATE CANCER (HCC): ICD-10-CM

## 2020-05-15 DIAGNOSIS — D64.9 ANEMIA, UNSPECIFIED TYPE: ICD-10-CM

## 2020-05-15 DIAGNOSIS — Z79.899 MEDICATION MANAGEMENT: ICD-10-CM

## 2020-05-15 DIAGNOSIS — R73.02 IGT (IMPAIRED GLUCOSE TOLERANCE): Primary | ICD-10-CM

## 2020-05-16 LAB
ALBUMIN SERPL-MCNC: 4.6 G/DL (ref 3.5–5.2)
ALBUMIN/GLOB SERPL: 1.8 G/DL
ALP SERPL-CCNC: 56 U/L (ref 39–117)
ALT SERPL-CCNC: 22 U/L (ref 1–41)
AST SERPL-CCNC: 21 U/L (ref 1–40)
BASOPHILS # BLD AUTO: 0.03 10*3/MM3 (ref 0–0.2)
BASOPHILS NFR BLD AUTO: 0.7 % (ref 0–1.5)
BILIRUB SERPL-MCNC: 0.3 MG/DL (ref 0.2–1.2)
BUN SERPL-MCNC: 15 MG/DL (ref 8–23)
BUN/CREAT SERPL: 13.8 (ref 7–25)
CALCIUM SERPL-MCNC: 9.6 MG/DL (ref 8.6–10.5)
CHLORIDE SERPL-SCNC: 106 MMOL/L (ref 98–107)
CHOLEST SERPL-MCNC: 229 MG/DL (ref 0–200)
CO2 SERPL-SCNC: 24 MMOL/L (ref 22–29)
CREAT SERPL-MCNC: 1.09 MG/DL (ref 0.76–1.27)
EOSINOPHIL # BLD AUTO: 0.06 10*3/MM3 (ref 0–0.4)
EOSINOPHIL NFR BLD AUTO: 1.5 % (ref 0.3–6.2)
ERYTHROCYTE [DISTWIDTH] IN BLOOD BY AUTOMATED COUNT: 12.6 % (ref 12.3–15.4)
GLOBULIN SER CALC-MCNC: 2.5 GM/DL
GLUCOSE SERPL-MCNC: 85 MG/DL (ref 65–99)
HBA1C MFR BLD: 5.5 % (ref 4.8–5.6)
HCT VFR BLD AUTO: 42.6 % (ref 37.5–51)
HDLC SERPL-MCNC: 78 MG/DL (ref 40–60)
HGB BLD-MCNC: 14 G/DL (ref 13–17.7)
IMM GRANULOCYTES # BLD AUTO: 0 10*3/MM3 (ref 0–0.05)
IMM GRANULOCYTES NFR BLD AUTO: 0 % (ref 0–0.5)
LDLC SERPL CALC-MCNC: 131 MG/DL (ref 0–100)
LYMPHOCYTES # BLD AUTO: 1.54 10*3/MM3 (ref 0.7–3.1)
LYMPHOCYTES NFR BLD AUTO: 37.7 % (ref 19.6–45.3)
MCH RBC QN AUTO: 30.8 PG (ref 26.6–33)
MCHC RBC AUTO-ENTMCNC: 32.9 G/DL (ref 31.5–35.7)
MCV RBC AUTO: 93.6 FL (ref 79–97)
MONOCYTES # BLD AUTO: 0.51 10*3/MM3 (ref 0.1–0.9)
MONOCYTES NFR BLD AUTO: 12.5 % (ref 5–12)
NEUTROPHILS # BLD AUTO: 1.95 10*3/MM3 (ref 1.7–7)
NEUTROPHILS NFR BLD AUTO: 47.6 % (ref 42.7–76)
NRBC BLD AUTO-RTO: 0.2 /100 WBC (ref 0–0.2)
PLATELET # BLD AUTO: 166 10*3/MM3 (ref 140–450)
POTASSIUM SERPL-SCNC: 4.4 MMOL/L (ref 3.5–5.2)
PROT SERPL-MCNC: 7.1 G/DL (ref 6–8.5)
PSA SERPL-MCNC: 0.09 NG/ML (ref 0–4)
RBC # BLD AUTO: 4.55 10*6/MM3 (ref 4.14–5.8)
SODIUM SERPL-SCNC: 141 MMOL/L (ref 136–145)
TRIGL SERPL-MCNC: 101 MG/DL (ref 0–150)
VLDLC SERPL CALC-MCNC: 20.2 MG/DL
WBC # BLD AUTO: 4.09 10*3/MM3 (ref 3.4–10.8)

## 2020-05-22 ENCOUNTER — TELEMEDICINE (OUTPATIENT)
Dept: INTERNAL MEDICINE | Facility: CLINIC | Age: 70
End: 2020-05-22

## 2020-05-22 DIAGNOSIS — K21.9 GASTROESOPHAGEAL REFLUX DISEASE WITHOUT ESOPHAGITIS: Primary | ICD-10-CM

## 2020-05-22 DIAGNOSIS — R73.02 IGT (IMPAIRED GLUCOSE TOLERANCE): ICD-10-CM

## 2020-05-22 DIAGNOSIS — G47.33 OSA (OBSTRUCTIVE SLEEP APNEA): ICD-10-CM

## 2020-05-22 PROBLEM — E78.1 HYPERTRIGLYCERIDEMIA: Status: ACTIVE | Noted: 2020-05-22

## 2020-05-22 PROCEDURE — 99213 OFFICE O/P EST LOW 20 MIN: CPT | Performed by: INTERNAL MEDICINE

## 2020-05-22 NOTE — PROGRESS NOTES
Subjective   Dennis De Anda is a 70 y.o. male.     Chief Complaint   Patient presents with   • Anemia   • Hyperlipidemia   • Heartburn         In for recheck of DVT and PE.  Just a few small peripheral PEs on CT scan.  Extensive DVT left leg which was proximal femoral lead to thrombolysis and embolectomy.      Heartburn   He reports no chest pain, no coughing or no wheezing. This is a chronic problem. The current episode started more than 1 year ago. The problem has been unchanged. Pertinent negatives include no fatigue.   Hyperglycemia   This is a chronic problem. The current episode started more than 1 year ago. The problem occurs constantly. The problem has been unchanged. Pertinent negatives include no chest pain, chills, coughing, fatigue or fever.   Deep Vein Thrombosis   This is a chronic problem. The current episode started more than 1 month ago. The problem has been gradually improving. Pertinent negatives include no chest pain, chills, coughing, fatigue or fever. The symptoms are aggravated by standing.   Sleep Apnea   This is a chronic problem. The current episode started more than 1 year ago. Pertinent negatives include no chest pain, chills, coughing, fatigue or fever.   Anemia   Presents for follow-up visit. There has been no fever.        The following portions of the patient's history were reviewed and updated as appropriate: allergies, current medications, past social history and problem list.    No outpatient medications have been marked as taking for the 5/22/20 encounter (Telemedicine) with Scott Gonzales MD.       Review of Systems   Constitutional: Negative for chills, fatigue and fever.   Respiratory: Negative for cough, shortness of breath and wheezing.    Cardiovascular: Positive for leg swelling. Negative for chest pain.       Objective   There were no vitals filed for this visit.   There were no vitals filed for this visit. [unfilled]  There is no height or weight on file to  calculate BMI.      Physical Exam   Constitutional: He appears well-developed and well-nourished.   Cardiovascular: Normal rate, regular rhythm, normal heart sounds and intact distal pulses.   Pulmonary/Chest: Effort normal and breath sounds normal.         Problem List Items Addressed This Visit        Cardiovascular and Mediastinum    Hypertriglyceridemia       Respiratory    TRIXIE (obstructive sleep apnea)       Digestive    Gastroesophageal reflux disease without esophagitis - Primary       Endocrine    IGT (impaired glucose tolerance)        Assessment/Plan   Video visit today due to the COVID-19 pandemic.  In for recheck of DVT and PE.  Small peripheral PE but extensive DVT left leg.  Had thrombolysis and embolectomy.  The DVT occurred following plane ride to [place] followed 3 weeks later by a road trip/car ride to [facility].  He has completed 18 months of eliquis treatment but will plan to continue lifelong low dose eliquis at this point given the severity of his original DVT.  He's using a support hose.  Not wearing the support hose much in warm weather.  We will recheck in 6 months.  Annual lab work today May 2020.  Annual wellness visit November 2020.  Currently is asymptomatic and doing great.  Spent 15 minutes with patient day on the visit.         Dragon disclaimer:   Much of this encounter note is an electronic transcription/translation of spoken language to printed text. The electronic translation of spoken language may permit erroneous, or at times, nonsensical words or phrases to be inadvertently transcribed; Although I have reviewed the note for such errors, some may still exist.

## 2020-05-27 RX ORDER — APIXABAN 2.5 MG/1
TABLET, FILM COATED ORAL
Qty: 180 TABLET | Refills: 1 | Status: SHIPPED | OUTPATIENT
Start: 2020-05-27 | End: 2020-11-18

## 2020-08-17 ENCOUNTER — TELEPHONE (OUTPATIENT)
Dept: INTERNAL MEDICINE | Facility: CLINIC | Age: 70
End: 2020-08-17

## 2020-08-17 DIAGNOSIS — E78.00 HIGH CHOLESTEROL: Primary | ICD-10-CM

## 2020-08-17 NOTE — TELEPHONE ENCOUNTER
I think that is a good idea.  Make sure he knows that this is not a covered service, it is out-of-pocket.  [place] to [place]

## 2020-08-19 ENCOUNTER — HOSPITAL ENCOUNTER (OUTPATIENT)
Dept: CT IMAGING | Facility: HOSPITAL | Age: 70
Discharge: HOME | End: 2020-08-19
Admitting: INTERNAL MEDICINE

## 2020-08-19 PROCEDURE — 75571 CT HRT W/O DYE W/CA TEST: CPT

## 2020-11-18 RX ORDER — APIXABAN 2.5 MG/1
TABLET, FILM COATED ORAL
Qty: 180 TABLET | Refills: 1 | Status: SHIPPED | OUTPATIENT
Start: 2020-11-18 | End: 2021-05-14

## 2020-12-14 ENCOUNTER — TELEPHONE (OUTPATIENT)
Dept: GASTROENTEROLOGY | Facility: CLINIC | Age: 70
End: 2020-12-14

## 2020-12-14 ENCOUNTER — OFFICE VISIT (OUTPATIENT)
Dept: INTERNAL MEDICINE | Facility: CLINIC | Age: 70
End: 2020-12-14

## 2020-12-14 VITALS
HEART RATE: 52 BPM | WEIGHT: 171 LBS | RESPIRATION RATE: 16 BRPM | SYSTOLIC BLOOD PRESSURE: 128 MMHG | TEMPERATURE: 96.6 F | BODY MASS INDEX: 24.48 KG/M2 | HEIGHT: 70 IN | OXYGEN SATURATION: 98 % | DIASTOLIC BLOOD PRESSURE: 74 MMHG

## 2020-12-14 DIAGNOSIS — R73.02 IGT (IMPAIRED GLUCOSE TOLERANCE): ICD-10-CM

## 2020-12-14 DIAGNOSIS — E55.9 VITAMIN D DEFICIENCY: ICD-10-CM

## 2020-12-14 DIAGNOSIS — G47.33 OSA (OBSTRUCTIVE SLEEP APNEA): ICD-10-CM

## 2020-12-14 DIAGNOSIS — Z12.11 SCREEN FOR COLON CANCER: ICD-10-CM

## 2020-12-14 DIAGNOSIS — K21.9 GASTROESOPHAGEAL REFLUX DISEASE WITHOUT ESOPHAGITIS: ICD-10-CM

## 2020-12-14 DIAGNOSIS — I26.99 PE (PULMONARY THROMBOEMBOLISM) (HCC): Primary | ICD-10-CM

## 2020-12-14 PROCEDURE — 99214 OFFICE O/P EST MOD 30 MIN: CPT | Performed by: INTERNAL MEDICINE

## 2020-12-14 PROCEDURE — G0439 PPPS, SUBSEQ VISIT: HCPCS | Performed by: INTERNAL MEDICINE

## 2020-12-14 NOTE — PROGRESS NOTES
The ABCs of the Annual Wellness Visit  Subsequent Medicare Wellness Visit    Chief Complaint   Patient presents with   • Medicare Wellness-subsequent   • Hyperglycemia   • Sleep Apnea   • Deep Vein Thrombosis       Subjective   History of Present Illness:  Dennis De Anda is a 70 y.o. male who presents for a Subsequent Medicare Wellness Visit.    HEALTH RISK ASSESSMENT    Recent Hospitalizations:  No hospitalization(s) within the last year.    Current Medical Providers:  Patient Care Team:  Scott Gonzales MD as PCP - General (Internal Medicine)  Scott Gonzales MD as PCP - Internal Medicine  Justin Acosta MD as Surgeon (Vascular Surgery)    Smoking Status:  Social History     Tobacco Use   Smoking Status Never Smoker   Smokeless Tobacco Never Used       Alcohol Consumption:  Social History     Substance and Sexual Activity   Alcohol Use Yes   • Alcohol/week: 14.0 standard drinks   • Types: 14 Glasses of wine per week   • Frequency: 4 or more times a week   • Drinks per session: 1 or 2   • Binge frequency: Never    Comment: Beer or wine occas       Depression Screen:   PHQ-2/PHQ-9 Depression Screening 12/14/2020   Little interest or pleasure in doing things 0   Feeling down, depressed, or hopeless 0   Trouble falling or staying asleep, or sleeping too much 0   Feeling tired or having little energy 0   Poor appetite or overeating 0   Feeling bad about yourself - or that you are a failure or have let yourself or your family down 0   Trouble concentrating on things, such as reading the newspaper or watching television 0   Moving or speaking so slowly that other people could have noticed. Or the opposite - being so fidgety or restless that you have been moving around a lot more than usual 0   Thoughts that you would be better off dead, or of hurting yourself in some way 0   Total Score 0   If you checked off any problems, how difficult have these problems made it for you to do your work, take care of things at  home, or get along with other people? Not difficult at all       Fall Risk Screen:  CHELSY Fall Risk Assessment was completed, and patient is at LOW risk for falls.Assessment completed on:12/14/2020    Health Habits and Functional and Cognitive Screening:  Functional & Cognitive Status 12/14/2020   Do you have difficulty preparing food and eating? No   Do you have difficulty bathing yourself, getting dressed or grooming yourself? No   Do you have difficulty using the toilet? No   Do you have difficulty moving around from place to place? No   Do you have trouble with steps or getting out of a bed or a chair? No   Current Diet Well Balanced Diet   Dental Exam Up to date   Eye Exam Up to date   Exercise (times per week) 7 times per week   Current Exercises Include Walking;Weightlifting   Current Exercise Activities Include -   Do you need help using the phone?  No   Are you deaf or do you have serious difficulty hearing?  Yes   Do you need help with transportation? No   Do you need help shopping? No   Do you need help preparing meals?  No   Do you need help with housework?  No   Do you need help with laundry? No   Do you need help taking your medications? No   Do you need help managing money? No   Do you ever drive or ride in a car without wearing a seat belt? No   Have you felt unusual stress, anger or loneliness in the last month? No   Who do you live with? Spouse   If you need help, do you have trouble finding someone available to you? No   Have you been bothered in the last four weeks by sexual problems? No   Do you have difficulty concentrating, remembering or making decisions? No         Does the patient have evidence of cognitive impairment? No    Asprin use counseling:Contraindicated from taking ASA    Age-appropriate Screening Schedule:  Refer to the list below for future screening recommendations based on patient's age, sex and/or medical conditions. Orders for these recommended tests are listed in the plan  section. The patient has been provided with a written plan.    Health Maintenance   Topic Date Due   • COLONOSCOPY  01/01/2020   • TDAP/TD VACCINES (3 - Td) 10/18/2026   • INFLUENZA VACCINE  Completed   • ZOSTER VACCINE  Completed          The following portions of the patient's history were reviewed and updated as appropriate: allergies, current medications, past family history, past medical history, past social history, past surgical history and problem list.    Outpatient Medications Prior to Visit   Medication Sig Dispense Refill   • Eliquis 2.5 MG tablet tablet TAKE 1 TABLET BY MOUTH TWICE DAILY 180 tablet 1     No facility-administered medications prior to visit.        Patient Active Problem List   Diagnosis   • IGT (impaired glucose tolerance)   • MVP (mitral valve prolapse)   • DDD (degenerative disc disease), lumbar   • Prostate cancer (CMS/HCC)   • Peyronie disease   • Angiodysplasia   • TRIXIE (obstructive sleep apnea)   • Vitamin D deficiency   • OA (osteoarthritis)   • PE (pulmonary thromboembolism) (CMS/[facility])   • Acute deep vein thrombosis (DVT) of femoral vein of left lower extremity (CMS/[facility])   • DJD (degenerative joint disease) of knee   • PVC (premature ventricular contraction)   • Anxiety   • 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 Patient has an advance directive (not in EMR), copy requested.    Review of Systems    Compared to one year ago, the patient feels his physical health is the same.  Compared to one year ago, the patient feels his mental health is the same.    Reviewed chart for potential of high risk medication in the elderly: yes  Reviewed chart for potential of harmful drug interactions in the elderly:yes    Objective         Vitals:    12/14/20 1002   BP: 128/74   Pulse: 52   Resp: 16   Temp: 96.6 °F (35.9 °C)   TempSrc: Infrared  "  SpO2: 98%   Weight: 77.6 kg (171 lb)   Height: 177.8 cm (70\")   PainSc:   1       Body mass index is 24.54 kg/m².  Discussed the patient's BMI with him. The BMI is in the acceptable range.    Physical Exam          Assessment/Plan   Medicare Risks and Personalized Health Plan  CMS Preventative Services Quick Reference  Advance Directive Discussion    The above risks/problems have been discussed with the patient.  Pertinent information has been shared with the patient in the After Visit Summary.  Follow up plans and orders are seen below in the Assessment/Plan Section.    Diagnoses and all orders for this visit:    1. PE (pulmonary thromboembolism) (CMS/[facility]) (Primary)    2. TRIXIE (obstructive sleep apnea)    3. Vitamin D deficiency    4. IGT (impaired glucose tolerance)    5. Gastroesophageal reflux disease without esophagitis    6. Screen for colon cancer  -     Ambulatory Referral to Gastroenterology      Follow Up:  Return in about 6 months (around 6/14/2021) for Recheck dvt, gerd, prostate ca, oa, igt.     An After Visit Summary and PPPS were given to the patient.             "

## 2020-12-14 NOTE — PROGRESS NOTES
Subjective   Dennis De Anda is a 70 y.o. male.     Chief Complaint   Patient presents with   • Medicare Wellness-subsequent   • Hyperglycemia   • Sleep Apnea   • Deep Vein Thrombosis         In for recheck of DVT and PE.  Just a few small peripheral PEs on CT scan.  Extensive DVT left leg which was proximal femoral lead to thrombolysis and embolectomy.      Hyperglycemia  This is a chronic problem. The current episode started more than 1 year ago. The problem occurs constantly. The problem has been unchanged. Pertinent negatives include no chest pain, chills, coughing, fatigue or fever.   Sleep Apnea  This is a chronic problem. The current episode started more than 1 year ago. Pertinent negatives include no chest pain, chills, coughing, fatigue or fever.   Deep Vein Thrombosis  This is a chronic problem. The current episode started more than 1 month ago. The problem has been gradually improving. Pertinent negatives include no chest pain, chills, coughing, fatigue or fever. The symptoms are aggravated by standing.   Heartburn  He reports no chest pain, no coughing or no wheezing. This is a chronic problem. The current episode started more than 1 year ago. The problem has been unchanged. Pertinent negatives include no fatigue.   Anemia  Presents for follow-up visit. There has been no fever.        The following portions of the patient's history were reviewed and updated as appropriate: allergies, current medications, past social history and problem list.    Outpatient Medications Marked as Taking for the 12/14/20 encounter (Office Visit) with Scott Gonzales MD   Medication Sig Dispense Refill   • Eliquis 2.5 MG tablet tablet TAKE 1 TABLET BY MOUTH TWICE DAILY 180 tablet 1       Review of Systems   Constitutional: Negative for chills, fatigue and fever.   Respiratory: Negative for cough, shortness of breath and wheezing.    Cardiovascular: Positive for leg swelling. Negative for chest pain.       Objective    Vitals:    12/14/20 1002   BP: 128/74   Pulse: 52   Resp: 16   Temp: 96.6 °F (35.9 °C)   SpO2: 98%          12/14/20  1002   Weight: 77.6 kg (171 lb)    [unfilled]  Body mass index is 24.54 kg/m².      Physical Exam   Constitutional: He appears well-developed.   Cardiovascular: Normal rate, regular rhythm and normal heart sounds.   Pulmonary/Chest: Effort normal and breath sounds normal. No respiratory distress. He has no wheezes.   Abdominal: Soft. Normal appearance.   Neurological: He is alert.         Problems Addressed this Visit        Cardiovascular and Mediastinum    PE (pulmonary thromboembolism) (CMS/[facility]) - Primary       Respiratory    TRIXIE (obstructive sleep apnea)       Digestive    Vitamin D deficiency    Gastroesophageal reflux disease without esophagitis       Endocrine    IGT (impaired glucose tolerance)      Diagnoses       Codes Comments    PE (pulmonary thromboembolism) (CMS/[facility])    -  Primary ICD-10-CM: I26.99  ICD-9-CM: 415.19     TRIXIE (obstructive sleep apnea)     ICD-10-CM: G47.33  ICD-9-CM: 327.23     Vitamin D deficiency     ICD-10-CM: E55.9  ICD-9-CM: 268.9     IGT (impaired glucose tolerance)     ICD-10-CM: R73.02  ICD-9-CM: 790.22     Gastroesophageal reflux disease without esophagitis     ICD-10-CM: K21.9  ICD-9-CM: 530.81         Assessment/Plan   In for recheck of DVT and PE.  Small peripheral PE but extensive DVT left leg.  Had thrombolysis and embolectomy.  The DVT occurred following plane ride to [place] followed 3 weeks later by a road trip/car ride to [facility].  He has completed 18 months of eliquis treatment but will plan to continue lifelong low dose eliquis at this point given the severity of his original DVT.  He's using a support hose with travel only at this point.  His peripheral edema is completely resolved at this point.  We will recheck in 6 months.  Annual lab work May 2020.  Annual wellness visit today December 2020.    PPE today includes face mask and eye  shield.         Dragon disclaimer:   Much of this encounter note is an electronic transcription/translation of spoken language to printed text. The electronic translation of spoken language may permit erroneous, or at times, nonsensical words or phrases to be inadvertently transcribed; Although I have reviewed the note for such errors, some may still exist.

## 2020-12-14 NOTE — PATIENT INSTRUCTIONS
Exercising to Stay Healthy  To become healthy and stay healthy, it is recommended that you do moderate-intensity and vigorous-intensity exercise. You can tell that you are exercising at a moderate intensity if your heart starts beating faster and you start breathing faster but can still hold a conversation. You can tell that you are exercising at a vigorous intensity if you are breathing much harder and faster and cannot hold a conversation while exercising.  Exercising regularly is important. It has many health benefits, such as:  · Improving overall fitness, flexibility, and endurance.  · Increasing bone density.  · Helping with weight control.  · Decreasing body fat.  · Increasing muscle strength.  · Reducing stress and tension.  · Improving overall health.  How often should I exercise?  Choose an activity that you enjoy, and set realistic goals. Your health care provider can help you make an activity plan that works for you.  Exercise regularly as told by your health care provider. This may include:  · Doing strength training two times a week, such as:  ? Lifting weights.  ? Using resistance bands.  ? Push-ups.  ? Sit-ups.  ? Yoga.  · Doing a certain intensity of exercise for a given amount of time. Choose from these options:  ? A total of 150 minutes of moderate-intensity exercise every week.  ? A total of 75 minutes of vigorous-intensity exercise every week.  ? A mix of moderate-intensity and vigorous-intensity exercise every week.  Children, pregnant women, people who have not exercised regularly, people who are overweight, and older adults may need to talk with a health care provider about what activities are safe to do. If you have a medical condition, be sure to talk with your health care provider before you start a new exercise program.  What are some exercise ideas?  Moderate-intensity exercise ideas include:  · Walking 1 mile (1.6 km) in about 15  minutes.  · Biking.  · Hiking.  · Golfing.  · Dancing.  · Water aerobics.  Vigorous-intensity exercise ideas include:  · Walking 4.5 miles (7.2 km) or more in about 1 hour.  · Jogging or running 5 miles (8 km) in about 1 hour.  · Biking 10 miles (16.1 km) or more in about 1 hour.  · Lap swimming.  · Roller-skating or in-line skating.  · Cross-country skiing.  · Vigorous competitive sports, such as football, basketball, and soccer.  · Jumping rope.  · Aerobic dancing.  What are some everyday activities that can help me to get exercise?  · Yard work, such as:  ? Pushing a .  ? Raking and bagging leaves.  · Washing your car.  · Pushing a stroller.  · Shoveling snow.  · Gardening.  · Washing windows or floors.  How can I be more active in my day-to-day activities?  · Use stairs instead of an elevator.  · Take a walk during your lunch break.  · If you drive, park your car farther away from your work or school.  · If you take public transportation, get off one stop early and walk the rest of the way.  · Stand up or walk around during all of your indoor phone calls.  · Get up, stretch, and walk around every 30 minutes throughout the day.  · Enjoy exercise with a friend. Support to continue exercising will help you keep a regular routine of activity.  What guidelines can I follow while exercising?  · Before you start a new exercise program, talk with your health care provider.  · Do not exercise so much that you hurt yourself, feel dizzy, or get very short of breath.  · Wear comfortable clothes and wear shoes with good support.  · Drink plenty of water while you exercise to prevent dehydration or heat stroke.  · Work out until your breathing and your heartbeat get faster.  Where to find more information  · [place] Department of Health and Human Services: www.hhs.gov  · Centers for Disease Control and Prevention (CDC): www.cdc.gov  Summary  · Exercising regularly is important. It will improve your overall fitness,  flexibility, and endurance.  · Regular exercise also will improve your overall health. It can help you control your weight, reduce stress, and improve your bone density.  · Do not exercise so much that you hurt yourself, feel dizzy, or get very short of breath.  · Before you start a new exercise program, talk with your health care provider.  This information is not intended to replace advice given to you by your health care provider. Make sure you discuss any questions you have with your health care provider.  Document Revised: 11/30/2018 Document Reviewed: 11/08/2018  Elsevier Patient Education © 2020 [employer]  Advance Directive    Advance directives are legal documents that let you make choices ahead of time about your health care and medical treatment in case you become unable to communicate for yourself. Advance directives are a way for you to make known your wishes to family, friends, and health care providers. This can let others know about your end-of-life care if you become unable to communicate.  Discussing and writing advance directives should happen over time rather than all at once. Advance directives can be changed depending on your situation and what you want, even after you have signed the advance directives.  There are different types of advance directives, such as:  · Medical power of .  · Living will.  · Do not resuscitate (DNR) or do not attempt resuscitation (DNAR) order.  Health care proxy and medical power of   A health care proxy is also called a health care agent. This is a person who is appointed to make medical decisions for you in cases where you are unable to make the decisions yourself. Generally, people choose someone they know well and trust to represent their preferences. Make sure to ask this person for an agreement to act as your proxy. A proxy may have to exercise judgment in the event of a medical decision for which your wishes are not known.  A medical power  of  is a legal document that names your health care proxy. Depending on the laws in your state, after the document is written, it may also need to be:  · Signed.  · Notarized.  · Dated.  · Copied.  · Witnessed.  · Incorporated into your medical record.  You may also want to appoint someone to manage your money in a situation in which you are unable to do so. This is called a durable power of  for finances. It is a separate legal document from the durable power of  for health care. You may choose the same person or someone different from your health care proxy to act as your agent in money matters.  If you do not appoint a proxy, or if there is a concern that the proxy is not acting in your best interests, a court may appoint a guardian to act on your behalf.  Living will  A living will is a set of instructions that state your wishes about medical care when you cannot express them yourself. Health care providers should keep a copy of your living will in your medical record. You may want to give a copy to family members or friends. To alert caregivers in case of an emergency, you can place a card in your wallet to let them know that you have a living will and where they can find it. A living will is used if you become:  · Terminally ill.  · Disabled.  · Unable to communicate or make decisions.  Items to consider in your living will include:  · To use or not to use life-support equipment, such as dialysis machines and breathing machines (ventilators).  · A DNR or DNAR order. This tells health care providers not to use cardiopulmonary resuscitation (CPR) if breathing or heartbeat stops.  · To use or not to use tube feeding.  · To be given or not to be given food and fluids.  · Comfort (palliative) care when the goal becomes comfort rather than a cure.  · Donation of organs and tissues.  A living will does not give instructions for distributing your money and property if you should pass away.  DNR  or DNAR  A DNR or DNAR order is a request not to have CPR in the event that your heart stops beating or you stop breathing. If a DNR or DNAR order has not been made and shared, a health care provider will try to help any patient whose heart has stopped or who has stopped breathing. If you plan to have surgery, talk with your health care provider about how your DNR or DNAR order will be followed if problems occur.  What if I do not have an advance directive?  If you do not have an advance directive, some states assign family decision makers to act on your behalf based on how closely you are related to them. Each state has its own laws about advance directives. You may want to check with your health care provider, , or state representative about the laws in your state.  Summary  · Advance directives are the legal documents that allow you to make choices ahead of time about your health care and medical treatment in case you become unable to tell others about your care.  · The process of discussing and writing advance directives should happen over time. You can change the advance directives, even after you have signed them.  · Advance directives include DNR or DNAR orders, living alfonso, and designating an agent as your medical power of .  This information is not intended to replace advice given to you by your health care provider. Make sure you discuss any questions you have with your health care provider.  Document Revised: 07/16/2020 Document Reviewed: 07/16/2020  Elsevier Patient Education © 2020 [employer]

## 2020-12-30 ENCOUNTER — TELEPHONE (OUTPATIENT)
Dept: GASTROENTEROLOGY | Facility: CLINIC | Age: 70
End: 2020-12-30

## 2020-12-30 NOTE — TELEPHONE ENCOUNTER
Last scope approx 10 years ago, no record available-- no personal or family hx of polyps or colon ca-- Eliquis only medication patient is taking.      OA form scanned into media    Thank you

## 2021-01-05 ENCOUNTER — PREP FOR SURGERY (OUTPATIENT)
Dept: OTHER | Facility: HOSPITAL | Age: 71
End: 2021-01-05

## 2021-01-05 ENCOUNTER — TELEPHONE (OUTPATIENT)
Dept: GASTROENTEROLOGY | Facility: CLINIC | Age: 71
End: 2021-01-05

## 2021-01-05 DIAGNOSIS — Z12.11 ENCOUNTER FOR SCREENING FOR MALIGNANT NEOPLASM OF COLON: Primary | ICD-10-CM

## 2021-01-05 NOTE — TELEPHONE ENCOUNTER
Dr. Gonzales,    Please see the below request and let us know if it is ok for patient to hold his Eliquis.    Thank you

## 2021-01-05 NOTE — TELEPHONE ENCOUNTER
----- Message from Jamie Nuñez MD sent at 1/5/2021 10:31 AM EST -----  Regarding: OA colon  OA colon ok, split dose  suprep or miralax prep  Hold Eliquis the night before and the morning of the procedure as long as his PCP or cardiologist is agreeable to holding those 2 doses, he can bring the Eliquis with him and probably take it immediately after the procedure as long as I do not find any large polyps

## 2021-01-05 NOTE — TELEPHONE ENCOUNTER
Perfectly okay to stop the Eliquis as long as they need to stop it.  He currently is using it for DVT prophylaxis.

## 2021-01-11 ENCOUNTER — TELEPHONE (OUTPATIENT)
Dept: GASTROENTEROLOGY | Facility: CLINIC | Age: 71
End: 2021-01-11

## 2021-01-11 PROBLEM — Z12.11 ENCOUNTER FOR SCREENING FOR MALIGNANT NEOPLASM OF COLON: Status: ACTIVE | Noted: 2021-01-11

## 2021-01-20 ENCOUNTER — TRANSCRIBE ORDERS (OUTPATIENT)
Dept: LAB | Facility: HOSPITAL | Age: 71
End: 2021-01-20

## 2021-01-20 DIAGNOSIS — Z01.818 OTHER SPECIFIED PRE-OPERATIVE EXAMINATION: Primary | ICD-10-CM

## 2021-01-29 ENCOUNTER — OFFICE VISIT (OUTPATIENT)
Dept: INTERNAL MEDICINE | Facility: CLINIC | Age: 71
End: 2021-01-29

## 2021-01-29 VITALS
TEMPERATURE: 96.6 F | OXYGEN SATURATION: 98 % | HEIGHT: 70 IN | BODY MASS INDEX: 24.34 KG/M2 | DIASTOLIC BLOOD PRESSURE: 88 MMHG | WEIGHT: 170 LBS | HEART RATE: 54 BPM | RESPIRATION RATE: 16 BRPM | SYSTOLIC BLOOD PRESSURE: 144 MMHG

## 2021-01-29 DIAGNOSIS — H61.21 IMPACTED CERUMEN OF RIGHT EAR: Primary | ICD-10-CM

## 2021-01-29 PROCEDURE — 99213 OFFICE O/P EST LOW 20 MIN: CPT | Performed by: INTERNAL MEDICINE

## 2021-01-29 NOTE — PROGRESS NOTES
Subjective   Dennis De Anda is a 71 y.o. male.     Chief Complaint   Patient presents with   • Ear Fullness   • Cerumen Impaction         Ear Fullness   There is pain in the right ear. This is a new problem. The current episode started in the past 7 days. The problem occurs constantly. The problem has been unchanged. Associated symptoms include hearing loss.        The following portions of the patient's history were reviewed and updated as appropriate: allergies, current medications, past social history and problem list.    Outpatient Medications Marked as Taking for the 1/29/21 encounter (Office Visit) with Scott Gonzales MD   Medication Sig Dispense Refill   • Eliquis 2.5 MG tablet tablet TAKE 1 TABLET BY MOUTH TWICE DAILY 180 tablet 1       Review of Systems   HENT: Positive for hearing loss.        Objective   Vitals:    01/29/21 1147   BP: 144/88   Pulse: 54   Resp: 16   Temp: 96.6 °F (35.9 °C)   SpO2: 98%      Wt Readings from Last 3 Encounters:   01/29/21 77.1 kg (170 lb)   12/14/20 77.6 kg (171 lb)   11/25/19 81.2 kg (179 lb)    Body mass index is 24.39 kg/m².      Physical Exam  Constitutional:       Appearance: Normal appearance.   HENT:      Right Ear: Tympanic membrane, ear canal and external ear normal. There is impacted cerumen.      Left Ear: Tympanic membrane, ear canal and external ear normal.   Neurological:      Mental Status: He is alert.           Problems Addressed this Visit     None      Visit Diagnoses     Impacted cerumen of right ear    -  Primary      Diagnoses       Codes Comments    Impacted cerumen of right ear    -  Primary ICD-10-CM: H61.21  ICD-9-CM: 380.4         Assessment/Plan   In today with hearing loss right ear.  Wax impaction on that side.  Left is normal.  We soaked the right ear with peroxide and then irrigated it free.  Post irrigation exam is normal.  Symptoms resolved.    PPE today included face mask and eye shield.             Dragon disclaimer:   Much of  this encounter note is an electronic transcription/translation of spoken language to printed text. The electronic translation of spoken language may permit erroneous, or at times, nonsensical words or phrases to be inadvertently transcribed; Although I have reviewed the note for such errors, some may still exist.

## 2021-01-30 ENCOUNTER — LAB (OUTPATIENT)
Dept: LAB | Facility: HOSPITAL | Age: 71
End: 2021-01-30

## 2021-01-30 DIAGNOSIS — Z01.818 OTHER SPECIFIED PRE-OPERATIVE EXAMINATION: ICD-10-CM

## 2021-01-30 PROCEDURE — U0004 COV-19 TEST NON-CDC HGH THRU: HCPCS

## 2021-01-30 PROCEDURE — C9803 HOPD COVID-19 SPEC COLLECT: HCPCS

## 2021-02-01 LAB — SARS-COV-2 RNA RESP QL NAA+PROBE: NOT DETECTED

## 2021-02-02 ENCOUNTER — ANESTHESIA EVENT (OUTPATIENT)
Dept: GASTROENTEROLOGY | Facility: HOSPITAL | Age: 71
End: 2021-02-02

## 2021-02-02 ENCOUNTER — ANESTHESIA (OUTPATIENT)
Dept: GASTROENTEROLOGY | Facility: HOSPITAL | Age: 71
End: 2021-02-02

## 2021-02-02 ENCOUNTER — HOSPITAL ENCOUNTER (OUTPATIENT)
Facility: HOSPITAL | Age: 71
Setting detail: HOSPITAL OUTPATIENT SURGERY
Discharge: HOME | End: 2021-02-02
Attending: INTERNAL MEDICINE | Admitting: INTERNAL MEDICINE

## 2021-02-02 VITALS
BODY MASS INDEX: 23.48 KG/M2 | OXYGEN SATURATION: 99 % | SYSTOLIC BLOOD PRESSURE: 112 MMHG | HEART RATE: 58 BPM | WEIGHT: 164 LBS | HEIGHT: 70 IN | DIASTOLIC BLOOD PRESSURE: 52 MMHG | RESPIRATION RATE: 16 BRPM

## 2021-02-02 DIAGNOSIS — Z12.11 ENCOUNTER FOR SCREENING FOR MALIGNANT NEOPLASM OF COLON: ICD-10-CM

## 2021-02-02 PROCEDURE — 45385 COLONOSCOPY W/LESION REMOVAL: CPT | Performed by: INTERNAL MEDICINE

## 2021-02-02 PROCEDURE — S0260 H&P FOR SURGERY: HCPCS | Performed by: INTERNAL MEDICINE

## 2021-02-02 PROCEDURE — 88305 TISSUE EXAM BY PATHOLOGIST: CPT | Performed by: INTERNAL MEDICINE

## 2021-02-02 PROCEDURE — 25010000002 PROPOFOL 10 MG/ML EMULSION: Performed by: NURSE ANESTHETIST, CERTIFIED REGISTERED

## 2021-02-02 RX ORDER — PROPOFOL 10 MG/ML
VIAL (ML) INTRAVENOUS CONTINUOUS PRN
Status: DISCONTINUED | OUTPATIENT
Start: 2021-02-02 | End: 2021-02-02 | Stop reason: SURG

## 2021-02-02 RX ORDER — GLYCOPYRROLATE 0.2 MG/ML
INJECTION INTRAMUSCULAR; INTRAVENOUS AS NEEDED
Status: DISCONTINUED | OUTPATIENT
Start: 2021-02-02 | End: 2021-02-02 | Stop reason: SURG

## 2021-02-02 RX ORDER — PROPOFOL 10 MG/ML
VIAL (ML) INTRAVENOUS AS NEEDED
Status: DISCONTINUED | OUTPATIENT
Start: 2021-02-02 | End: 2021-02-02 | Stop reason: SURG

## 2021-02-02 RX ORDER — LIDOCAINE HYDROCHLORIDE 20 MG/ML
INJECTION, SOLUTION INFILTRATION; PERINEURAL AS NEEDED
Status: DISCONTINUED | OUTPATIENT
Start: 2021-02-02 | End: 2021-02-02 | Stop reason: SURG

## 2021-02-02 RX ORDER — SODIUM CHLORIDE, SODIUM LACTATE, POTASSIUM CHLORIDE, CALCIUM CHLORIDE 600; 310; 30; 20 MG/100ML; MG/100ML; MG/100ML; MG/100ML
30 INJECTION, SOLUTION INTRAVENOUS CONTINUOUS PRN
Status: DISCONTINUED | OUTPATIENT
Start: 2021-02-02 | End: 2021-02-02 | Stop reason: HOSPADM

## 2021-02-02 RX ADMIN — LIDOCAINE HYDROCHLORIDE 60 MG: 20 INJECTION, SOLUTION INFILTRATION; PERINEURAL at 13:14

## 2021-02-02 RX ADMIN — GLYCOPYRROLATE 0.3 MG: 0.2 INJECTION INTRAMUSCULAR; INTRAVENOUS at 13:26

## 2021-02-02 RX ADMIN — SODIUM CHLORIDE, POTASSIUM CHLORIDE, SODIUM LACTATE AND CALCIUM CHLORIDE 30 ML/HR: 600; 310; 30; 20 INJECTION, SOLUTION INTRAVENOUS at 12:44

## 2021-02-02 RX ADMIN — PROPOFOL 150 MG: 10 INJECTION, EMULSION INTRAVENOUS at 13:14

## 2021-02-02 RX ADMIN — PROPOFOL 200 MCG/KG/MIN: 10 INJECTION, EMULSION INTRAVENOUS at 13:14

## 2021-02-02 NOTE — H&P
"[facility]  Pre Procedure History & Physical    Chief Complaint:   Time for my colonoscopy    Subjective     HPI:   71 y.o. male     Past Medical History:   Past Medical History:   Diagnosis Date   • DVT (deep venous thrombosis) (CMS/[facility])     left leg - 2018   • Prostate cancer (CMS/HCC) 12/01/1997    Dr. De Oliveira   • Sleep apnea     mild       Family History:  Family History   Problem Relation Age of Onset   • Alzheimer's disease Mother    • Prostate cancer Father    • Narcolepsy Son    • Atrial fibrillation Son        Social History:   reports that he has never smoked. He has never used smokeless tobacco. He reports current alcohol use of about 14.0 standard drinks of alcohol per week. He reports that he does not use drugs.    Medications:   Medications Prior to Admission   Medication Sig Dispense Refill Last Dose   • Eliquis 2.5 MG tablet tablet TAKE 1 TABLET BY MOUTH TWICE DAILY 180 tablet 1 1/30/2021       Allergies:  Penicillins    ROS:    Pertinent items are noted in HPI     Objective     Blood pressure 131/85, pulse 54, resp. rate 12, height 177.8 cm (70\"), weight 74.4 kg (164 lb), SpO2 96 %.    Physical Exam   Constitutional: Pt is oriented to person, place, and time and well-developed, well-nourished, and in no distress.   HENT:   Mouth/Throat: Oropharynx is clear and moist.   Neck: Normal range of motion. Neck supple.   Cardiovascular: Normal rate, regular rhythm and normal heart sounds.    Pulmonary/Chest: Effort normal and breath sounds normal. No respiratory distress. No  wheezes.   Abdominal: Soft. Bowel sounds are normal.   Skin: Skin is warm and dry.   Psychiatric: Mood, memory, affect and judgment normal.     Assessment/Plan     Diagnosis:  Encounter for screening for colon cancer    Anticipated Surgical Procedure:  Colonoscopy    The risks, benefits, and alternatives of this procedure have been discussed with the patient or the responsible party- the patient understands and " agrees to proceed.

## 2021-02-02 NOTE — ANESTHESIA POSTPROCEDURE EVALUATION
"Patient: Dennis De Anda    Procedure Summary     Date: 02/02/21 Room / Location: [facility] ENDOSCOPY 8 /  [facility]    Anesthesia Start: 1313 Anesthesia Stop: 1345    Procedure: COLONOSCOPY TO CECUM AND TERMINAL ILEUM WITH COLD BIOPSY POLYPECTOMIES X2 (N/A ) Diagnosis:       Encounter for screening for malignant neoplasm of colon      (Encounter for screening for malignant neoplasm of colon [Z12.11])    Surgeon: Jamie Nuñez MD Provider: Rupali Menon MD    Anesthesia Type: MAC ASA Status: 3          Anesthesia Type: MAC    Vitals  Vitals Value Taken Time   /52 02/02/21 1404   Temp     Pulse 58 02/02/21 1404   Resp 16 02/02/21 1404   SpO2 99 % 02/02/21 1404           Post Anesthesia Care and Evaluation    Patient location during evaluation: PHASE II  Patient participation: complete - patient participated  Level of consciousness: awake  Pain management: adequate  Airway patency: patent  Anesthetic complications: No anesthetic complications    Cardiovascular status: acceptable  Respiratory status: acceptable  Hydration status: acceptable    Comments: /52 (BP Location: Left arm, Patient Position: Lying)   Pulse 58   Resp 16   Ht 177.8 cm (70\")   Wt 74.4 kg (164 lb)   SpO2 99%   BMI 23.53 kg/m²       "

## 2021-02-02 NOTE — ANESTHESIA PREPROCEDURE EVALUATION
Anesthesia Evaluation     Patient summary reviewed and Nursing notes reviewed   NPO Solid Status: > 8 hours  NPO Liquid Status: > 2 hours           Airway   Mallampati: II  Dental - normal exam     Pulmonary - normal exam   (+) pulmonary embolism, sleep apnea on CPAP,   Cardiovascular - normal exam    (+) valvular problems/murmurs MVP, DVT,       Neuro/Psych  (+) psychiatric history Anxiety,     GI/Hepatic/Renal/Endo    (+)  GERD,      Musculoskeletal     Abdominal    Substance History      OB/GYN          Other   arthritis,    history of cancer                    Anesthesia Plan    ASA 3     MAC

## 2021-02-03 LAB
LAB AP CASE REPORT: NORMAL
PATH REPORT.FINAL DX SPEC: NORMAL
PATH REPORT.GROSS SPEC: NORMAL

## 2021-05-14 RX ORDER — APIXABAN 2.5 MG/1
TABLET, FILM COATED ORAL
Qty: 180 TABLET | Refills: 1 | Status: SHIPPED | OUTPATIENT
Start: 2021-05-14 | End: 2021-08-18

## 2021-06-14 ENCOUNTER — TELEPHONE (OUTPATIENT)
Dept: INTERNAL MEDICINE | Facility: CLINIC | Age: 71
End: 2021-06-14

## 2021-06-14 DIAGNOSIS — R73.02 IGT (IMPAIRED GLUCOSE TOLERANCE): Primary | ICD-10-CM

## 2021-06-14 DIAGNOSIS — C61 PROSTATE CANCER (HCC): Primary | ICD-10-CM

## 2021-06-14 DIAGNOSIS — C61 PROSTATE CANCER (HCC): ICD-10-CM

## 2021-06-14 DIAGNOSIS — R73.02 IGT (IMPAIRED GLUCOSE TOLERANCE): ICD-10-CM

## 2021-06-14 DIAGNOSIS — E78.00 HIGH CHOLESTEROL: ICD-10-CM

## 2021-06-14 DIAGNOSIS — D64.9 ANEMIA, UNSPECIFIED TYPE: ICD-10-CM

## 2021-06-14 DIAGNOSIS — Z12.11 SCREEN FOR COLON CANCER: ICD-10-CM

## 2021-06-14 NOTE — TELEPHONE ENCOUNTER
Spoke with patient, will call him back tomorrow and schedule him for a lab appointment before upcoming visit if needed. Not sure if patient needs labs and there are no current orders. Will check with MA.

## 2021-06-15 ENCOUNTER — OFFICE VISIT (OUTPATIENT)
Dept: INTERNAL MEDICINE | Facility: CLINIC | Age: 71
End: 2021-06-15

## 2021-06-15 DIAGNOSIS — Z01.89 LABORATORY PROCEDURE: Primary | ICD-10-CM

## 2021-06-15 LAB
ALBUMIN SERPL-MCNC: 4.5 G/DL (ref 3.5–5.2)
ALBUMIN/GLOB SERPL: 2.1 G/DL
ALP SERPL-CCNC: 59 U/L (ref 39–117)
ALT SERPL-CCNC: 20 U/L (ref 1–41)
AST SERPL-CCNC: 21 U/L (ref 1–40)
BASOPHILS # BLD AUTO: 0.01 10*3/MM3 (ref 0–0.2)
BASOPHILS NFR BLD AUTO: 0.3 % (ref 0–1.5)
BILIRUB SERPL-MCNC: 0.7 MG/DL (ref 0–1.2)
BUN SERPL-MCNC: 16 MG/DL (ref 8–23)
BUN/CREAT SERPL: 16.3 (ref 7–25)
CALCIUM SERPL-MCNC: 9.2 MG/DL (ref 8.6–10.5)
CHLORIDE SERPL-SCNC: 107 MMOL/L (ref 98–107)
CHOLEST SERPL-MCNC: 216 MG/DL (ref 0–200)
CHOLEST/HDLC SERPL: 2.51 {RATIO}
CO2 SERPL-SCNC: 23.4 MMOL/L (ref 22–29)
CREAT SERPL-MCNC: 0.98 MG/DL (ref 0.76–1.27)
EOSINOPHIL # BLD AUTO: 0.03 10*3/MM3 (ref 0–0.4)
EOSINOPHIL NFR BLD AUTO: 0.8 % (ref 0.3–6.2)
ERYTHROCYTE [DISTWIDTH] IN BLOOD BY AUTOMATED COUNT: 12.8 % (ref 12.3–15.4)
GLOBULIN SER CALC-MCNC: 2.1 GM/DL
GLUCOSE SERPL-MCNC: 91 MG/DL (ref 65–99)
HBA1C MFR BLD: 5.6 % (ref 4.8–5.6)
HCT VFR BLD AUTO: 42.6 % (ref 37.5–51)
HDLC SERPL-MCNC: 86 MG/DL (ref 40–60)
HGB BLD-MCNC: 13.9 G/DL (ref 13–17.7)
IMM GRANULOCYTES # BLD AUTO: 0.01 10*3/MM3 (ref 0–0.05)
IMM GRANULOCYTES NFR BLD AUTO: 0.3 % (ref 0–0.5)
LDLC SERPL CALC-MCNC: 120 MG/DL (ref 0–100)
LYMPHOCYTES # BLD AUTO: 1.38 10*3/MM3 (ref 0.7–3.1)
LYMPHOCYTES NFR BLD AUTO: 35.6 % (ref 19.6–45.3)
MCH RBC QN AUTO: 30.5 PG (ref 26.6–33)
MCHC RBC AUTO-ENTMCNC: 32.6 G/DL (ref 31.5–35.7)
MCV RBC AUTO: 93.4 FL (ref 79–97)
MONOCYTES # BLD AUTO: 0.43 10*3/MM3 (ref 0.1–0.9)
MONOCYTES NFR BLD AUTO: 11.1 % (ref 5–12)
NEUTROPHILS # BLD AUTO: 2.02 10*3/MM3 (ref 1.7–7)
NEUTROPHILS NFR BLD AUTO: 51.9 % (ref 42.7–76)
NRBC BLD AUTO-RTO: 0 /100 WBC (ref 0–0.2)
PLATELET # BLD AUTO: 169 10*3/MM3 (ref 140–450)
POTASSIUM SERPL-SCNC: 4.3 MMOL/L (ref 3.5–5.2)
PROT SERPL-MCNC: 6.6 G/DL (ref 6–8.5)
PSA SERPL-MCNC: 0.08 NG/ML (ref 0–4)
RBC # BLD AUTO: 4.56 10*6/MM3 (ref 4.14–5.8)
SODIUM SERPL-SCNC: 140 MMOL/L (ref 136–145)
TRIGL SERPL-MCNC: 59 MG/DL (ref 0–150)
VLDLC SERPL CALC-MCNC: 10 MG/DL (ref 5–40)
WBC # BLD AUTO: 3.88 10*3/MM3 (ref 3.4–10.8)

## 2021-06-22 ENCOUNTER — OFFICE VISIT (OUTPATIENT)
Dept: INTERNAL MEDICINE | Facility: CLINIC | Age: 71
End: 2021-06-22

## 2021-06-22 VITALS
HEART RATE: 48 BPM | TEMPERATURE: 97.7 F | BODY MASS INDEX: 24.34 KG/M2 | RESPIRATION RATE: 16 BRPM | DIASTOLIC BLOOD PRESSURE: 72 MMHG | WEIGHT: 170 LBS | OXYGEN SATURATION: 97 % | HEIGHT: 70 IN | SYSTOLIC BLOOD PRESSURE: 128 MMHG

## 2021-06-22 DIAGNOSIS — I82.412 ACUTE DEEP VEIN THROMBOSIS (DVT) OF FEMORAL VEIN OF LEFT LOWER EXTREMITY (HCC): Primary | ICD-10-CM

## 2021-06-22 DIAGNOSIS — R73.02 IGT (IMPAIRED GLUCOSE TOLERANCE): Chronic | ICD-10-CM

## 2021-06-22 DIAGNOSIS — I34.1 MVP (MITRAL VALVE PROLAPSE): Chronic | ICD-10-CM

## 2021-06-22 DIAGNOSIS — M51.36 DDD (DEGENERATIVE DISC DISEASE), LUMBAR: ICD-10-CM

## 2021-06-22 DIAGNOSIS — K21.9 GASTROESOPHAGEAL REFLUX DISEASE WITHOUT ESOPHAGITIS: Chronic | ICD-10-CM

## 2021-06-22 PROCEDURE — 99214 OFFICE O/P EST MOD 30 MIN: CPT | Performed by: INTERNAL MEDICINE

## 2021-06-22 RX ORDER — METHYLPREDNISOLONE 4 MG/1
TABLET ORAL
Qty: 1 EACH | Refills: 0 | Status: SHIPPED | OUTPATIENT
Start: 2021-06-22 | End: 2021-08-30

## 2021-06-22 NOTE — PROGRESS NOTES
Subjective   Dennis De Anda is a 71 y.o. male.     Chief Complaint   Patient presents with   • Hyperglycemia   • Osteoarthritis   • DVT   • Prostate Cancer   • Pulmonary Embolism   • Heartburn   • Groin Pain     possibly inguinal hernia         In for recheck of DVT and PE.  Just a few small peripheral PEs on CT scan.  Extensive DVT left leg which was proximal femoral lead to thrombolysis and embolectomy.      Hyperglycemia  This is a chronic problem. The current episode started more than 1 year ago. The problem occurs constantly. The problem has been unchanged. Pertinent negatives include no chest pain, chills or coughing.   Osteoarthritis  Presents for follow-up visit. His past medical history is significant for osteoarthritis.   Prostate Cancer  This is a chronic problem. The current episode started more than 1 year ago. Pertinent negatives include no chest pain, chills or coughing.   Heartburn  He reports no chest pain, no coughing or no wheezing. This is a chronic problem. The current episode started more than 1 year ago. The problem has been unchanged.   Groin Pain  Pertinent negatives include no chest pain, chills, coughing or shortness of breath.   Sleep Apnea  This is a chronic problem. The current episode started more than 1 year ago. Pertinent negatives include no chest pain, chills or coughing.   Deep Vein Thrombosis  This is a chronic problem. The current episode started more than 1 month ago. The problem has been gradually improving. Pertinent negatives include no chest pain, chills or coughing. The symptoms are aggravated by standing.   Anemia  Presents for follow-up visit.        The following portions of the patient's history were reviewed and updated as appropriate: allergies, current medications, past social history and problem list.    Outpatient Medications Marked as Taking for the 6/22/21 encounter (Office Visit) with Scott Gonzales MD   Medication Sig Dispense Refill   • Eliquis 2.5 MG  tablet tablet TAKE 1 TABLET BY MOUTH TWICE DAILY 180 tablet 1       Review of Systems   Constitutional: Negative for chills.   Respiratory: Negative for cough, shortness of breath and wheezing.    Cardiovascular: Positive for leg swelling. Negative for chest pain.       Objective   Vitals:    06/22/21 0838   BP: 128/72   Pulse: (!) 48   Resp: 16   Temp: 97.7 °F (36.5 °C)   SpO2: 97%          06/22/21 0838   Weight: 77.1 kg (170 lb)    [unfilled]  Body mass index is 24.39 kg/m².      Physical Exam   Constitutional: He appears well-developed.   Cardiovascular: Normal rate, regular rhythm and normal heart sounds.   Pulmonary/Chest: Effort normal and breath sounds normal. No respiratory distress. He has no wheezes.   Abdominal: Soft. Normal appearance.   Neurological: He is alert.     PSA DIAGNOSTIC (06/15/2021 13:28)  Comprehensive Metabolic Panel (06/15/2021 13:28)  Lipid Panel With / Chol / HDL Ratio (06/15/2021 13:28)  Hemoglobin A1c (06/15/2021 13:28)  CBC & Differential (06/15/2021 13:28)      Problems Addressed this Visit        Cardiac and Vasculature    MVP (mitral valve prolapse) (Chronic)       Coag and Thromboembolic    Acute deep vein thrombosis (DVT) of femoral vein of left lower extremity (CMS/HCC) - Primary       Endocrine and Metabolic    IGT (impaired glucose tolerance) (Chronic)       Gastrointestinal Abdominal     Gastroesophageal reflux disease without esophagitis (Chronic)      Diagnoses       Codes Comments    Acute deep vein thrombosis (DVT) of femoral vein of left lower extremity (CMS/HCC)    -  Primary ICD-10-CM: I82.412  ICD-9-CM: 453.41     MVP (mitral valve prolapse)     ICD-10-CM: I34.1  ICD-9-CM: 424.0     IGT (impaired glucose tolerance)     ICD-10-CM: R73.02  ICD-9-CM: 790.22     Gastroesophageal reflux disease without esophagitis     ICD-10-CM: K21.9  ICD-9-CM: 530.81         Assessment/Plan   In for recheck of DVT and PE.  Small peripheral PE but extensive DVT left leg.  Had  thrombolysis and embolectomy.  The DVT occurred following plane ride to [place] followed 3 weeks later by a road trip/car ride to [facility].  He has completed 18 months of eliquis treatment but will plan to continue lifelong low dose eliquis at this point given the severity of his original DVT.  He's using a support hose with travel only at this point.  His peripheral edema is completely resolved at this point.  We will recheck in 6 months.  He has had some pain in the left testicle region on and off since his prostate surgery some 20 years ago.  Also has a history of DDD and intermittent sciatica.  Worried about a hernia but his inguinal exam today is perfectly normal.  I suspect this is referred pain.  For now he will just use Tylenol as needed.  We will add a Medrol Dosepak today to see if it is efficacious.  It helped his back pain once before.  Annual lab work May 2020.  Annual wellness visit today December 2020.    The above information was reviewed again today 06/22/21.  It continues to be accurate as reflected above and is unchanged.  History, physical and review of systems all reviewed and are unchanged.  Medications were reviewed today and continue the current dosing.    PPE today includes face mask and eye shield.         Dragon disclaimer:   Much of this encounter note is an electronic transcription/translation of spoken language to printed text. The electronic translation of spoken language may permit erroneous, or at times, nonsensical words or phrases to be inadvertently transcribed; Although I have reviewed the note for such errors, some may still exist.

## 2021-08-18 RX ORDER — APIXABAN 2.5 MG/1
TABLET, FILM COATED ORAL
Qty: 180 TABLET | Refills: 1 | Status: SHIPPED | OUTPATIENT
Start: 2021-08-18 | End: 2022-05-16

## 2021-08-30 ENCOUNTER — OFFICE VISIT (OUTPATIENT)
Dept: INTERNAL MEDICINE | Facility: CLINIC | Age: 71
End: 2021-08-30

## 2021-08-30 VITALS
HEART RATE: 60 BPM | SYSTOLIC BLOOD PRESSURE: 126 MMHG | TEMPERATURE: 96.4 F | RESPIRATION RATE: 16 BRPM | BODY MASS INDEX: 24.77 KG/M2 | WEIGHT: 173 LBS | DIASTOLIC BLOOD PRESSURE: 82 MMHG | HEIGHT: 70 IN | OXYGEN SATURATION: 98 %

## 2021-08-30 DIAGNOSIS — L03.116 CELLULITIS OF LEFT LOWER EXTREMITY: Primary | ICD-10-CM

## 2021-08-30 PROCEDURE — 99213 OFFICE O/P EST LOW 20 MIN: CPT | Performed by: INTERNAL MEDICINE

## 2021-08-30 RX ORDER — DOXYCYCLINE HYCLATE 100 MG/1
100 CAPSULE ORAL 2 TIMES DAILY
Qty: 14 CAPSULE | Refills: 0 | Status: SHIPPED | OUTPATIENT
Start: 2021-08-30 | End: 2021-09-10 | Stop reason: SDUPTHER

## 2021-08-30 RX ORDER — CEPHALEXIN 500 MG/1
1000 CAPSULE ORAL 2 TIMES DAILY
Qty: 28 CAPSULE | Refills: 0 | Status: SHIPPED | OUTPATIENT
Start: 2021-08-30 | End: 2021-09-10 | Stop reason: SDUPTHER

## 2021-08-30 NOTE — PROGRESS NOTES
Subjective   Dennis De Anda is a 71 y.o. male.     Chief Complaint   Patient presents with   • Leg Pain     left leg since saturday, no bug bites   • Leg Swelling     bright red x 2 days         In today with pain and erythema on the distal left shin medial aspect.  Present for 2 days.  No fever or chills.       The following portions of the patient's history were reviewed and updated as appropriate: allergies, current medications, past social history and problem list.    Outpatient Medications Marked as Taking for the 8/30/21 encounter (Office Visit) with Scott Gonzales MD   Medication Sig Dispense Refill   • Eliquis 2.5 MG tablet tablet TAKE 1 TABLET BY MOUTH TWICE DAILY 180 tablet 1       Review of Systems   Constitutional: Negative for chills and fever.   Skin: Positive for color change.       Objective   Vitals:    08/30/21 1130   BP: 126/82   Pulse: 60   Resp: 16   Temp: 96.4 °F (35.8 °C)   SpO2: 98%      Wt Readings from Last 3 Encounters:   08/30/21 78.5 kg (173 lb)   06/22/21 77.1 kg (170 lb)   02/02/21 74.4 kg (164 lb)    Body mass index is 24.82 kg/m².      Physical Exam  Constitutional:       Appearance: Normal appearance.   Skin:     General: Skin is warm and dry.      Findings: Erythema present.   Neurological:      Mental Status: He is alert.           Problems Addressed this Visit     None      Visit Diagnoses     Cellulitis of left lower extremity    -  Primary      Diagnoses       Codes Comments    Cellulitis of left lower extremity    -  Primary ICD-10-CM: L03.116  ICD-9-CM: 682.6         Assessment/Plan   In today with 2 days of pain and swelling on the distal medial left calf.  This looks like an area of cellulitis.  Actually has the appearance of MRSA.  We will begin on doxycycline 100 mg twice daily along with Keflex 1000 mg twice daily for 1 week.  The above information was reviewed again today 08/30/21.  It continues to be accurate as reflected above and is unchanged.  History,  physical and review of systems all reviewed and are unchanged.  Medications were reviewed today and continue the current dosing.    PPE today includes face mask and eye shield.               Dragon disclaimer:   Much of this encounter note is an electronic transcription/translation of spoken language to printed text. The electronic translation of spoken language may permit erroneous, or at times, nonsensical words or phrases to be inadvertently transcribed; Although I have reviewed the note for such errors, some may still exist.

## 2021-09-10 RX ORDER — CEPHALEXIN 500 MG/1
1000 CAPSULE ORAL 2 TIMES DAILY
Qty: 28 CAPSULE | Refills: 0 | Status: SHIPPED | OUTPATIENT
Start: 2021-09-10 | End: 2021-12-20

## 2021-09-10 RX ORDER — DOXYCYCLINE HYCLATE 100 MG/1
100 CAPSULE ORAL 2 TIMES DAILY
Qty: 14 CAPSULE | Refills: 0 | Status: SHIPPED | OUTPATIENT
Start: 2021-09-10 | End: 2021-12-20

## 2021-09-10 NOTE — TELEPHONE ENCOUNTER
Caller: Dennis De Anda    Relationship: Self    Best call back number: [phone]     Medication needed:   Requested Prescriptions     Pending Prescriptions Disp Refills   • cephalexin (Keflex) 500 MG capsule 28 capsule 0     Sig: Take 2 capsules by mouth 2 (Two) Times a Day.   • doxycycline (VIBRAMYCIN) 100 MG capsule 14 capsule 0     Sig: Take 1 capsule by mouth 2 (Two) Times a Day.       When do you need the refill by: ASAP    What additional details did the patiet provide when requesting the medication: PATIENT STATES HE IS OUT, INFECTION GOT BETTER, BUT NOW SEEMS TO BE GETTING WORSE    Does the patient have less than a 3 day supply:  [x] Yes  [] No    What is the patient's preferred pharmacy: [id number] [address] AT [facility] - [phone] [facility] [phone] FX

## 2021-09-13 ENCOUNTER — OFFICE VISIT (OUTPATIENT)
Dept: INTERNAL MEDICINE | Facility: CLINIC | Age: 71
End: 2021-09-13

## 2021-09-13 VITALS
HEART RATE: 47 BPM | OXYGEN SATURATION: 100 % | HEIGHT: 70 IN | SYSTOLIC BLOOD PRESSURE: 128 MMHG | WEIGHT: 171 LBS | TEMPERATURE: 97.5 F | BODY MASS INDEX: 24.48 KG/M2 | RESPIRATION RATE: 16 BRPM | DIASTOLIC BLOOD PRESSURE: 70 MMHG

## 2021-09-13 DIAGNOSIS — L03.116 CELLULITIS OF LEFT LOWER EXTREMITY: Primary | ICD-10-CM

## 2021-09-13 PROCEDURE — 99213 OFFICE O/P EST LOW 20 MIN: CPT | Performed by: INTERNAL MEDICINE

## 2021-09-13 NOTE — PROGRESS NOTES
Subjective   Dennis De Anda is a 71 y.o. male.     Chief Complaint   Patient presents with   • Cellulitis         In today with pain and erythema on the distal left shin medial aspect.  Present for 2 days.  No fever or chills.    Cellulitis  This is a new problem. The current episode started 1 to 4 weeks ago. The problem occurs constantly. Pertinent negatives include no chills or fever.        The following portions of the patient's history were reviewed and updated as appropriate: allergies, current medications, past social history and problem list.    Outpatient Medications Marked as Taking for the 9/13/21 encounter (Office Visit) with Scott Gonzales MD   Medication Sig Dispense Refill   • cephalexin (Keflex) 500 MG capsule Take 2 capsules by mouth 2 (Two) Times a Day. 28 capsule 0   • doxycycline (VIBRAMYCIN) 100 MG capsule Take 1 capsule by mouth 2 (Two) Times a Day. 14 capsule 0   • Eliquis 2.5 MG tablet tablet TAKE 1 TABLET BY MOUTH TWICE DAILY 180 tablet 1       Review of Systems   Constitutional: Negative for chills and fever.   Skin: Positive for color change.       Objective   Vitals:    09/13/21 0749   BP: 128/70   Pulse: (!) 47   Resp: 16   Temp: 97.5 °F (36.4 °C)   SpO2: 100%      Wt Readings from Last 3 Encounters:   09/13/21 77.6 kg (171 lb)   08/30/21 78.5 kg (173 lb)   06/22/21 77.1 kg (170 lb)    Body mass index is 24.54 kg/m².      Physical Exam  Constitutional:       Appearance: Normal appearance.   Skin:     General: Skin is warm and dry.      Findings: Erythema present.   Neurological:      Mental Status: He is alert.           Problems Addressed this Visit     None      Diagnoses    None.       Assessment/Plan   In today with 15 days of pain and swelling on the distal medial left calf.  This looked like an area of cellulitis.  Actually had the appearance of MRSA.  Was treated with doxycycline 100 mg twice daily along with Keflex 1000 mg twice daily for 1 week.  Was improving quite a  bit but then suddenly started getting worse 2 days ago.  Started back on antibiotics yesterday.  He is already quite a bit better today.  The calf looks almost normal to me.  There is a faint area of swelling around the central area of the cellulitis from 2 weeks ago.  Also a faint halo of erythema an inch beyond that.  We will go ahead and treat with 1 more week of Keflex.  Hold off on doxycycline now as long as he is getting better.      The above information was reviewed again today 09/13/21.  It continues to be accurate as reflected above and is unchanged.  History, physical and review of systems all reviewed and are unchanged.  Medications were reviewed today and continue the current dosing.    PPE today includes face mask and eye shield.                 Dragon disclaimer:   Much of this encounter note is an electronic transcription/translation of spoken language to printed text. The electronic translation of spoken language may permit erroneous, or at times, nonsensical words or phrases to be inadvertently transcribed; Although I have reviewed the note for such errors, some may still exist.

## 2021-12-20 ENCOUNTER — OFFICE VISIT (OUTPATIENT)
Dept: INTERNAL MEDICINE | Facility: CLINIC | Age: 71
End: 2021-12-20

## 2021-12-20 VITALS
SYSTOLIC BLOOD PRESSURE: 120 MMHG | HEIGHT: 70 IN | RESPIRATION RATE: 16 BRPM | WEIGHT: 179 LBS | BODY MASS INDEX: 25.62 KG/M2 | HEART RATE: 51 BPM | TEMPERATURE: 98.2 F | OXYGEN SATURATION: 98 % | DIASTOLIC BLOOD PRESSURE: 70 MMHG

## 2021-12-20 DIAGNOSIS — K21.9 GASTROESOPHAGEAL REFLUX DISEASE WITHOUT ESOPHAGITIS: Chronic | ICD-10-CM

## 2021-12-20 DIAGNOSIS — Z23 NEED FOR VACCINATION: ICD-10-CM

## 2021-12-20 DIAGNOSIS — R73.02 IGT (IMPAIRED GLUCOSE TOLERANCE): Primary | Chronic | ICD-10-CM

## 2021-12-20 DIAGNOSIS — G47.33 OSA (OBSTRUCTIVE SLEEP APNEA): Chronic | ICD-10-CM

## 2021-12-20 PROCEDURE — 99214 OFFICE O/P EST MOD 30 MIN: CPT | Performed by: INTERNAL MEDICINE

## 2021-12-20 PROCEDURE — 90662 IIV NO PRSV INCREASED AG IM: CPT | Performed by: INTERNAL MEDICINE

## 2021-12-20 PROCEDURE — G0008 ADMIN INFLUENZA VIRUS VAC: HCPCS | Performed by: INTERNAL MEDICINE

## 2021-12-20 RX ORDER — SILDENAFIL CITRATE 20 MG/1
20-100 TABLET ORAL DAILY PRN
Qty: 20 TABLET | Refills: 12 | Status: SHIPPED | OUTPATIENT
Start: 2021-12-20 | End: 2022-06-20

## 2021-12-20 NOTE — PROGRESS NOTES
Subjective   Dennis De Anda is a 71 y.o. male.     Chief Complaint   Patient presents with   • Prostate Cancer   • DVT         In for recheck of DVT and PE.  Just a few small peripheral PEs on CT scan.  Extensive DVT left leg which was proximal femoral lead to thrombolysis and embolectomy.      Prostate Cancer  This is a chronic problem. The current episode started more than 1 year ago. Pertinent negatives include no abdominal pain, chest pain, chills or fever.   Hyperglycemia  This is a chronic problem. The current episode started more than 1 year ago. The problem occurs constantly. Pertinent negatives include no abdominal pain, chest pain, chills or fever.   Osteoarthritis  Presents for follow-up visit. Pertinent negatives include no fever. His past medical history is significant for osteoarthritis.   Sleep Apnea  This is a chronic problem. The current episode started more than 1 year ago. Pertinent negatives include no abdominal pain, chest pain, chills or fever.   Deep Vein Thrombosis  This is a chronic problem. The current episode started more than 1 month ago. The problem has been gradually improving. Pertinent negatives include no abdominal pain, chest pain, chills or fever. The symptoms are aggravated by standing.   Anemia  Presents for follow-up visit. There has been no abdominal pain or fever.        The following portions of the patient's history were reviewed and updated as appropriate: allergies, current medications, past social history and problem list.    Outpatient Medications Marked as Taking for the 12/20/21 encounter (Office Visit) with Scott Gonzales MD   Medication Sig Dispense Refill   • Eliquis 2.5 MG tablet tablet TAKE 1 TABLET BY MOUTH TWICE DAILY 180 tablet 1       Review of Systems   Constitutional: Negative for chills and fever.   Respiratory: Negative for shortness of breath and wheezing.    Cardiovascular: Negative for chest pain and leg swelling.   Gastrointestinal: Negative for  abdominal pain.       Objective   Vitals:    12/20/21 0752   BP: 120/70   Pulse: 51   Resp: 16   Temp: 98.2 °F (36.8 °C)   SpO2: 98%          12/20/21 0752   Weight: 81.2 kg (179 lb)    [unfilled]  Body mass index is 25.68 kg/m².      Physical Exam   Constitutional: He appears well-developed.   Cardiovascular: Normal rate, regular rhythm and normal heart sounds.   Pulmonary/Chest: Effort normal and breath sounds normal. No respiratory distress. He has no wheezes.   Abdominal: Normal appearance.   Neurological: He is alert.         Problems Addressed this Visit        Endocrine and Metabolic    IGT (impaired glucose tolerance) - Primary (Chronic)       Gastrointestinal Abdominal     Gastroesophageal reflux disease without esophagitis (Chronic)       Sleep    TRIXIE (obstructive sleep apnea) (Chronic)      Other Visit Diagnoses     Need for vaccination          Diagnoses       Codes Comments    IGT (impaired glucose tolerance)    -  Primary ICD-10-CM: R73.02  ICD-9-CM: 790.22     Need for vaccination     ICD-10-CM: Z23  ICD-9-CM: V05.9     Gastroesophageal reflux disease without esophagitis     ICD-10-CM: K21.9  ICD-9-CM: 530.81     TRIXIE (obstructive sleep apnea)     ICD-10-CM: G47.33  ICD-9-CM: 327.23         Assessment/Plan   In for recheck of DVT and PE.  Small peripheral PE but extensive DVT left leg.  Had thrombolysis and embolectomy.  The DVT occurred following plane ride to [place] followed 3 weeks later by a road trip/car ride to [facility].  He has completed 18 months of eliquis treatment but will plan to continue lifelong low dose eliquis at this point given the severity of his original DVT.  He's using a support hose with travel only at this point.  His peripheral edema is completely resolved at this point.  We will recheck in 6 months.  Annual lab work June 2021.  Annual wellness visit today December 2021.  Okay a prescription of Viagra.  Remains on Eliquis and that is reviewed today.  Reviewed some  information on chronic migraine for long-term memory issues.  Spent 40 minutes with patient on the visit today.  Answered a variety of questions regarding COVID-19.    The above information was reviewed again today 12/20/21.  It continues to be accurate as reflected above and is unchanged.  History, physical and review of systems all reviewed and are unchanged.  Medications were reviewed today and continue the current dosing.    PPE today includes face mask and eye shield.          Dragon disclaimer:   Much of this encounter note is an electronic transcription/translation of spoken language to printed text. The electronic translation of spoken language may permit erroneous, or at times, nonsensical words or phrases to be inadvertently transcribed; Although I have reviewed the note for such errors, some may still exist.

## 2021-12-20 NOTE — PATIENT INSTRUCTIONS
Influenza (Flu) Vaccine (Inactivated or Recombinant): What You Need to Know  1. Why get vaccinated?  Influenza vaccine can prevent influenza (flu).  Flu is a contagious disease that spreads around the [place] every year, usually between October and May. Anyone can get the flu, but it is more dangerous for some people. Infants and young children, people 65 years of age and older, pregnant women, and people with certain health conditions or a weakened immune system are at greatest risk of flu complications.  Pneumonia, bronchitis, sinus infections and ear infections are examples of flu-related complications. If you have a medical condition, such as heart disease, cancer or diabetes, flu can make it worse.  Flu can cause fever and chills, sore throat, muscle aches, fatigue, cough, headache, and runny or stuffy nose. Some people may have vomiting and diarrhea, though this is more common in children than adults.  Each year thousands of people in the [place] die from flu, and many more are hospitalized. Flu vaccine prevents millions of illnesses and flu-related visits to the doctor each year.  2. Influenza vaccine  CDC recommends everyone 6 months of age and older get vaccinated every flu season. Children 6 months through 8 years of age may need 2 doses during a single flu season. Everyone else needs only 1 dose each flu season.  It takes about 2 weeks for protection to develop after vaccination.  There are many flu viruses, and they are always changing. Each year a new flu vaccine is made to protect against three or four viruses that are likely to cause disease in the upcoming flu season. Even when the vaccine doesn't exactly match these viruses, it may still provide some protection.  Influenza vaccine does not cause flu.  Influenza vaccine may be given at the same time as other vaccines.  3. Talk with your health care provider  Tell your vaccine provider if the person getting the vaccine:  · Has had an  allergic reaction after a previous dose of influenza vaccine, or has any severe, life-threatening allergies.  · Has ever had Guillain-Barré Syndrome (also called GBS).  In some cases, your health care provider may decide to postpone influenza vaccination to a future visit.  People with minor illnesses, such as a cold, may be vaccinated. People who are moderately or severely ill should usually wait until they recover before getting influenza vaccine.  Your health care provider can give you more information.  4. Risks of a vaccine reaction  · Soreness, redness, and swelling where shot is given, fever, muscle aches, and headache can happen after influenza vaccine.  · There may be a very small increased risk of Guillain-Barré Syndrome (GBS) after inactivated influenza vaccine (the flu shot).  Young children who get the flu shot along with pneumococcal vaccine (PCV13), and/or DTaP vaccine at the same time might be slightly more likely to have a seizure caused by fever. Tell your health care provider if a child who is getting flu vaccine has ever had a seizure.  People sometimes faint after medical procedures, including vaccination. Tell your provider if you feel dizzy or have vision changes or ringing in the ears.  As with any medicine, there is a very remote chance of a vaccine causing a severe allergic reaction, other serious injury, or death.  5. What if there is a serious problem?  An allergic reaction could occur after the vaccinated person leaves the clinic. If you see signs of a severe allergic reaction (hives, swelling of the face and throat, difficulty breathing, a fast heartbeat, dizziness, or weakness), call 9-1-1 and get the person to the nearest hospital.  For other signs that concern you, call your health care provider.  Adverse reactions should be reported to the Vaccine Adverse Event Reporting System (VAERS). Your health care provider will usually file this report, or you can do it yourself. Visit the  VAERS website at www.vaers.[facility].gov or call 1-866.863.6577. VAERS is only for reporting reactions, and VAERS staff do not give medical advice.  6. The National Vaccine Injury Compensation Program  The National Vaccine Injury Compensation Program (VICP) is a federal program that was created to compensate people who may have been injured by certain vaccines. Visit the VICP website at www.[facility].gov/vaccinecompensation or call 1-798.157.2804 to learn about the program and about filing a claim. There is a time limit to file a claim for compensation.  7. How can I learn more?  · Ask your healthcare provider.  · Call your local or state health department.  · Contact the Centers for Disease Control and Prevention (CDC):  ? Call 1-760.899.3054 (0-070-AVN-INFO) or  ? Visit CDC's www.cdc.gov/flu  Vaccine Information Statement (Interim) Inactivated Influenza Vaccine (8/15/2019)  This information is not intended to replace advice given to you by your health care provider. Make sure you discuss any questions you have with your health care provider.  Document Revised: 12/09/2020 Document Reviewed: 12/09/2020  Elsevier Patient Education © 2021 [employer] Inc.

## 2022-02-15 RX ORDER — SILDENAFIL 50 MG/1
50 TABLET, FILM COATED ORAL DAILY PRN
Qty: 30 TABLET | Refills: 0 | Status: SHIPPED | OUTPATIENT
Start: 2022-02-15 | End: 2022-06-20 | Stop reason: SDUPTHER

## 2022-05-16 RX ORDER — APIXABAN 2.5 MG/1
TABLET, FILM COATED ORAL
Qty: 180 TABLET | Refills: 1 | Status: SHIPPED | OUTPATIENT
Start: 2022-05-16 | End: 2022-11-11

## 2022-06-20 ENCOUNTER — OFFICE VISIT (OUTPATIENT)
Dept: INTERNAL MEDICINE | Facility: CLINIC | Age: 72
End: 2022-06-20

## 2022-06-20 VITALS
HEIGHT: 70 IN | BODY MASS INDEX: 25.2 KG/M2 | OXYGEN SATURATION: 99 % | DIASTOLIC BLOOD PRESSURE: 79 MMHG | TEMPERATURE: 97.3 F | SYSTOLIC BLOOD PRESSURE: 122 MMHG | HEART RATE: 62 BPM | RESPIRATION RATE: 16 BRPM | WEIGHT: 176 LBS

## 2022-06-20 DIAGNOSIS — R73.02 IGT (IMPAIRED GLUCOSE TOLERANCE): Chronic | ICD-10-CM

## 2022-06-20 DIAGNOSIS — I82.512 CHRONIC DEEP VEIN THROMBOSIS (DVT) OF FEMORAL VEIN OF LEFT LOWER EXTREMITY: ICD-10-CM

## 2022-06-20 DIAGNOSIS — G47.33 OSA (OBSTRUCTIVE SLEEP APNEA): Chronic | ICD-10-CM

## 2022-06-20 DIAGNOSIS — I34.1 MVP (MITRAL VALVE PROLAPSE): Primary | Chronic | ICD-10-CM

## 2022-06-20 DIAGNOSIS — C61 PROSTATE CANCER: ICD-10-CM

## 2022-06-20 DIAGNOSIS — Z79.899 MEDICATION MANAGEMENT: ICD-10-CM

## 2022-06-20 DIAGNOSIS — Z20.822 CLOSE EXPOSURE TO COVID-19 VIRUS: ICD-10-CM

## 2022-06-20 PROCEDURE — 99214 OFFICE O/P EST MOD 30 MIN: CPT | Performed by: INTERNAL MEDICINE

## 2022-06-20 RX ORDER — SILDENAFIL 50 MG/1
50 TABLET, FILM COATED ORAL DAILY PRN
Qty: 10 TABLET | Refills: 10 | Status: SHIPPED | OUTPATIENT
Start: 2022-06-20

## 2022-06-20 NOTE — PROGRESS NOTES
The ABCs of the Annual Wellness Visit  Subsequent Medicare Wellness Visit    Chief Complaint   Patient presents with   • igt   • Heartburn   • Acute deep vein thrombosis (DVT) of femoral vein of left lo      Subjective    History of Present Illness:  Dennis De Anda is a 72 y.o. male who presents for a Subsequent Medicare Wellness Visit.    The following portions of the patient's history were reviewed and   updated as appropriate: allergies, current medications, past family history, past medical history, past social history, past surgical history and problem list.    Compared to one year ago, the patient feels his physical   health is the same.    Compared to one year ago, the patient feels his mental   health is the same.    Recent Hospitalizations:  He was not admitted to the hospital during the last year.       Current Medical Providers:  Patient Care Team:  Scott Gonzales MD as PCP - General (Internal Medicine)  Scott Gonzales MD as PCP - Internal Medicine  Justin Acosta MD as Surgeon (Vascular Surgery)    Outpatient Medications Prior to Visit   Medication Sig Dispense Refill   • Eliquis 2.5 MG tablet tablet TAKE 1 TABLET BY MOUTH TWICE DAILY 180 tablet 1   • sildenafil (REVATIO) 20 MG tablet Take 1-5 tablets by mouth Daily As Needed (sex). 20 tablet 12   • sildenafil (Viagra) 50 MG tablet Take 1 tablet by mouth Daily As Needed for Erectile Dysfunction. 30 tablet 0     No facility-administered medications prior to visit.       No opioid medication identified on active medication list. I have reviewed chart for other potential  high risk medication/s and harmful drug interactions in the elderly.          Aspirin is not on active medication list.  Aspirin use is not indicated based on review of current medical condition/s. Risk of harm outweighs potential benefits.  .    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  ACP discussion was held with the patient during this visit. Patient has an advance directive (not in EMR), copy requested.          Objective    Vitals:    06/20/22 0950   BP: 122/79   Pulse: 62   Resp: 16   Temp: 97.3 °F (36.3 °C)   TempSrc: Temporal   SpO2: 99%   Weight: 79.8 kg (176 lb)   Height: 177.8 cm (70\")   PainSc: 0-No pain     Estimated body mass index is 25.25 kg/m² as calculated from the following:    Height as of this encounter: 177.8 cm (70\").    Weight as of this encounter: 79.8 kg (176 lb).    BMI is >= 25 and <30. (Overweight) The following options were offered after discussion;: weight loss educational material (shared in after visit summary) and exercise counseling/recommendations      Does the patient have evidence of cognitive impairment? No    Physical Exam            HEALTH RISK ASSESSMENT    Smoking Status:  Social History     Tobacco Use   Smoking Status Never Smoker   Smokeless Tobacco Never Used     Alcohol Consumption:  Social History     Substance and Sexual Activity   Alcohol Use Yes   • Alcohol/week: 14.0 standard drinks   • Types: 14 Glasses of wine per week    Comment: Beer or wine occas     Fall Risk Screen:    CHELSY Fall Risk Assessment has not been completed.    Depression Screening:  PHQ-2/PHQ-9 Depression Screening 6/20/2022   Retired PHQ-9 Total Score -   Retired Total Score -   Little Interest or Pleasure in Doing Things 0-->not at all   Feeling Down, Depressed or Hopeless 0-->not at all "   PHQ-9: Brief Depression Severity Measure Score 0       Health Habits and Functional and Cognitive Screening:  Functional & Cognitive Status 12/20/2021   Do you have difficulty preparing food and eating? No   Do you have difficulty bathing yourself, getting dressed or grooming yourself? No   Do you have difficulty using the toilet? No   Do you have difficulty moving around from place to place? No   Do you have trouble with steps or getting out of a bed or a chair? No   Current Diet Well Balanced Diet   Dental Exam Up to date   Eye Exam Up to date   Exercise (times per week) 7 times per week   Current Exercises Include Walking;Weightlifting   Current Exercise Activities Include -   Do you need help using the phone?  No   Are you deaf or do you have serious difficulty hearing?  Yes   Do you need help with transportation? No   Do you need help shopping? No   Do you need help preparing meals?  No   Do you need help with housework?  No   Do you need help with laundry? No   Do you need help taking your medications? No   Do you need help managing money? No   Do you ever drive or ride in a car without wearing a seat belt? No   Have you felt unusual stress, anger or loneliness in the last month? No   Who do you live with? Spouse   If you need help, do you have trouble finding someone available to you? No   Have you been bothered in the last four weeks by sexual problems? No   Do you have difficulty concentrating, remembering or making decisions? No       Age-appropriate Screening Schedule:  Refer to the list below for future screening recommendations based on patient's age, sex and/or medical conditions. Orders for these recommended tests are listed in the plan section. The patient has been provided with a written plan.    Health Maintenance   Topic Date Due   • LIPID PANEL  06/15/2022   • INFLUENZA VACCINE  10/01/2022   • TDAP/TD VACCINES (4 - Td or Tdap) 10/18/2026   • ZOSTER VACCINE  Completed              Assessment &  Plan   CMS Preventative Services Quick Reference  Risk Factors Identified During Encounter  Chronic Pain   Dementia/Memory   Fall Risk-High or Moderate  The above risks/problems have been discussed with the patient.  Follow up actions/plans if indicated are seen below in the Assessment/Plan Section.  Pertinent information has been shared with the patient in the After Visit Summary.    Diagnoses and all orders for this visit:    1. MVP (mitral valve prolapse) (Primary)    2. IGT (impaired glucose tolerance)  -     Hemoglobin A1c    3. TRIXIE (obstructive sleep apnea)    4. Close exposure to COVID-19 virus  -     SARS-CoV-2 Antibody Profile, Nucleocapsid and Jorge L    5. Prostate cancer (HCC)  -     PSA DIAGNOSTIC    6. Medication management  -     CBC & Differential  -     Comprehensive Metabolic Panel  -     Lipid Panel With / Chol / HDL Ratio    Other orders  -     sildenafil (VIAGRA) 50 MG tablet; Take 1 tablet by mouth Daily As Needed for Erectile Dysfunction.  Dispense: 10 tablet; Refill: 10        Follow Up:   Return in about 6 months (around 12/20/2022) for Recheck igt, gerd, dvt.     An After Visit Summary and PPPS were made available to the patient.          I spent 15 minutes caring for Dennis on this date of service. This time includes time spent by me in the following activities:preparing for the visit

## 2022-06-20 NOTE — PROGRESS NOTES
Subjective   Dennis De Anda is a 72 y.o. male.     Chief Complaint   Patient presents with   • igt   • Heartburn   • Acute deep vein thrombosis (DVT) of femoral vein of left lo         In for recheck of chronic DVT with history of PE.  Just a few small peripheral PEs on CT scan.  Extensive DVT left leg which was proximal femoral lead to thrombolysis and embolectomy.  History of PAD.  History of prostate cancer.  No longer being followed by his urologist who retired.  We are now monitoring his prostate cancer.    Heartburn  He reports no abdominal pain, no chest pain or no wheezing.   Prostate Cancer  This is a chronic problem. The current episode started more than 1 year ago. Pertinent negatives include no abdominal pain or chest pain.   Hyperglycemia  This is a chronic problem. The current episode started more than 1 year ago. The problem occurs constantly. Pertinent negatives include no abdominal pain or chest pain.   Osteoarthritis  Presents for follow-up visit. His past medical history is significant for osteoarthritis.   Sleep Apnea  This is a chronic problem. The current episode started more than 1 year ago. Pertinent negatives include no abdominal pain or chest pain.   Deep Vein Thrombosis  This is a chronic problem. The current episode started more than 1 month ago. The problem has been gradually improving. Pertinent negatives include no abdominal pain or chest pain. The symptoms are aggravated by standing.   Anemia  Presents for follow-up visit. There has been no abdominal pain or palpitations.        The following portions of the patient's history were reviewed and updated as appropriate: allergies, current medications, past social history and problem list.    Outpatient Medications Marked as Taking for the 6/20/22 encounter (Office Visit) with Scott Gonzales MD   Medication Sig Dispense Refill   • Eliquis 2.5 MG tablet tablet TAKE 1 TABLET BY MOUTH TWICE DAILY 180 tablet 1   • sildenafil (REVATIO) 20  MG tablet Take 1-5 tablets by mouth Daily As Needed (sex). 20 tablet 12       Review of Systems   Respiratory: Negative for shortness of breath and wheezing.    Cardiovascular: Negative for chest pain, palpitations and leg swelling.   Gastrointestinal: Negative for abdominal pain.       Objective   Vitals:    06/20/22 0950   BP: 145/79   Pulse: 62   Resp: 16   Temp: 97.3 °F (36.3 °C)   SpO2: 99%          06/20/22  0950   Weight: 79.8 kg (176 lb)    [unfilled]  Body mass index is 25.25 kg/m².      Physical Exam   Constitutional: He appears well-developed.   Cardiovascular: Normal rate, regular rhythm and normal heart sounds.   Pulmonary/Chest: Effort normal and breath sounds normal. No respiratory distress. He has no wheezes.   Abdominal: Normal appearance.   Neurological: He is alert.         Problems Addressed this Visit        Cardiac and Vasculature    MVP (mitral valve prolapse) - Primary (Chronic)       Endocrine and Metabolic    IGT (impaired glucose tolerance) (Chronic)       Sleep    TRIXIE (obstructive sleep apnea) (Chronic)      Diagnoses       Codes Comments    MVP (mitral valve prolapse)    -  Primary ICD-10-CM: I34.1  ICD-9-CM: 424.0     IGT (impaired glucose tolerance)     ICD-10-CM: R73.02  ICD-9-CM: 790.22     TRIXIE (obstructive sleep apnea)     ICD-10-CM: G47.33  ICD-9-CM: 327.23         Assessment & Plan   In for recheck of chronic DVT and history of PE.  He had small peripheral PE but extensive DVT left leg.  Had thrombolysis and embolectomy.  The DVT occurred following plane ride to [place] followed 3 weeks later by a road trip/car ride to [facility].  He has completed 18 months of eliquis treatment but will plan to continue lifelong low dose eliquis at this point given the severity of his original DVT.  He's using a support hose with travel only at this point.  His peripheral edema is virtually resolved at this point.  We will recheck in 6 months.  Annual lab work today will include CBC,  CMP, lipids, A1c, PSA, UA, and COVID antibody June 2022.  We discussed the lack of recommendations on following COVID antibodies.  He knows this may be an out-of-pocket expense to check it.  Will refill a prescription of Viagra for his ED which is related to his radical prostatectomy for prostate cancer.  Blood pressure is borderline elevated today.  We will check it again and also he will start checking it at home.  Remains on Eliquis 2.5 mg twice daily and sildenafil 40 mg daily as needed and those are reviewed today.   Annual wellness visit today June 2022.    The above information was reviewed again today 06/20/22.  It continues to be accurate as reflected above and is unchanged.  History, physical and review of systems all reviewed and are unchanged.  Medications were reviewed today and continue the current dosing.    PPE today includes face mask and eye shield.         Dragon disclaimer:   Much of this encounter note is an electronic transcription/translation of spoken language to printed text. The electronic translation of spoken language may permit erroneous, or at times, nonsensical words or phrases to be inadvertently transcribed; Although I have reviewed the note for such errors, some may still exist.

## 2022-06-21 LAB
ALBUMIN SERPL-MCNC: 4.3 G/DL (ref 3.7–4.7)
ALBUMIN/GLOB SERPL: 1.9 {RATIO} (ref 1.2–2.2)
ALP SERPL-CCNC: 59 IU/L (ref 44–121)
ALT SERPL-CCNC: 21 IU/L (ref 0–44)
AST SERPL-CCNC: 21 IU/L (ref 0–40)
BASOPHILS # BLD AUTO: 0 X10E3/UL (ref 0–0.2)
BASOPHILS NFR BLD AUTO: 1 %
BILIRUB SERPL-MCNC: 0.5 MG/DL (ref 0–1.2)
BUN SERPL-MCNC: 17 MG/DL (ref 8–27)
BUN/CREAT SERPL: 17 (ref 10–24)
CALCIUM SERPL-MCNC: 9.3 MG/DL (ref 8.6–10.2)
CHLORIDE SERPL-SCNC: 105 MMOL/L (ref 96–106)
CHOLEST SERPL-MCNC: 232 MG/DL (ref 100–199)
CHOLEST/HDLC SERPL: 2.9 RATIO (ref 0–5)
CO2 SERPL-SCNC: 24 MMOL/L (ref 20–29)
CREAT SERPL-MCNC: 0.99 MG/DL (ref 0.76–1.27)
EGFRCR SERPLBLD CKD-EPI 2021: 81 ML/MIN/1.73
EOSINOPHIL # BLD AUTO: 0.1 X10E3/UL (ref 0–0.4)
EOSINOPHIL NFR BLD AUTO: 1 %
ERYTHROCYTE [DISTWIDTH] IN BLOOD BY AUTOMATED COUNT: 12.5 % (ref 11.6–15.4)
GLOBULIN SER CALC-MCNC: 2.3 G/DL (ref 1.5–4.5)
GLUCOSE SERPL-MCNC: 102 MG/DL (ref 65–99)
HBA1C MFR BLD: 5.8 % (ref 4.8–5.6)
HCT VFR BLD AUTO: 42.3 % (ref 37.5–51)
HDLC SERPL-MCNC: 79 MG/DL
HGB BLD-MCNC: 14.2 G/DL (ref 13–17.7)
IMM GRANULOCYTES # BLD AUTO: 0 X10E3/UL (ref 0–0.1)
IMM GRANULOCYTES NFR BLD AUTO: 0 %
LDLC SERPL CALC-MCNC: 144 MG/DL (ref 0–99)
LYMPHOCYTES # BLD AUTO: 1.2 X10E3/UL (ref 0.7–3.1)
LYMPHOCYTES NFR BLD AUTO: 33 %
Lab: NORMAL
Lab: NORMAL
MCH RBC QN AUTO: 30.3 PG (ref 26.6–33)
MCHC RBC AUTO-ENTMCNC: 33.6 G/DL (ref 31.5–35.7)
MCV RBC AUTO: 90 FL (ref 79–97)
MONOCYTES # BLD AUTO: 0.5 X10E3/UL (ref 0.1–0.9)
MONOCYTES NFR BLD AUTO: 13 %
NEUTROPHILS # BLD AUTO: 1.9 X10E3/UL (ref 1.4–7)
NEUTROPHILS NFR BLD AUTO: 52 %
PLATELET # BLD AUTO: 186 X10E3/UL (ref 150–450)
POTASSIUM SERPL-SCNC: 5.4 MMOL/L (ref 3.5–5.2)
PROT SERPL-MCNC: 6.6 G/DL (ref 6–8.5)
PSA SERPL-MCNC: <0.1 NG/ML (ref 0–4)
RBC # BLD AUTO: 4.69 X10E6/UL (ref 4.14–5.8)
SARS-COV-2 AB SERPL IA-ACNC: 215.5 U/ML
SARS-COV-2 AB SERPL QL IA: POSITIVE
SARS-COV-2 AB SERPL-IMP: POSITIVE
SODIUM SERPL-SCNC: 140 MMOL/L (ref 134–144)
TRIGL SERPL-MCNC: 55 MG/DL (ref 0–149)
VLDLC SERPL CALC-MCNC: 9 MG/DL (ref 5–40)
WBC # BLD AUTO: 3.6 X10E3/UL (ref 3.4–10.8)

## 2022-10-05 ENCOUNTER — OFFICE VISIT (OUTPATIENT)
Dept: INTERNAL MEDICINE | Facility: CLINIC | Age: 72
End: 2022-10-05

## 2022-10-05 ENCOUNTER — HOSPITAL ENCOUNTER (OUTPATIENT)
Dept: GENERAL RADIOLOGY | Facility: HOSPITAL | Age: 72
Discharge: HOME | End: 2022-10-05
Admitting: INTERNAL MEDICINE

## 2022-10-05 VITALS
HEIGHT: 70 IN | BODY MASS INDEX: 25.05 KG/M2 | HEART RATE: 79 BPM | DIASTOLIC BLOOD PRESSURE: 82 MMHG | WEIGHT: 175 LBS | TEMPERATURE: 97.3 F | SYSTOLIC BLOOD PRESSURE: 121 MMHG | RESPIRATION RATE: 16 BRPM | OXYGEN SATURATION: 99 %

## 2022-10-05 DIAGNOSIS — J40 BRONCHITIS: Primary | ICD-10-CM

## 2022-10-05 PROCEDURE — 71046 X-RAY EXAM CHEST 2 VIEWS: CPT

## 2022-10-05 PROCEDURE — 99213 OFFICE O/P EST LOW 20 MIN: CPT | Performed by: INTERNAL MEDICINE

## 2022-10-05 NOTE — PROGRESS NOTES
Subjective   Dennis De Anda is a 72 y.o. male.     Chief Complaint   Patient presents with   • Nasal Congestion     Patient states that he has had congestion for about 6 weeks. Patient states that he has had phlegm, but not consistently. Patient explains that about 6 weeks ago he had done house work and may have been exposed to histoplasmosis.    • drainage   • Shortness of Breath         Shortness of Breath  Associated symptoms include headaches and rhinorrhea. Pertinent negatives include no fever.        The following portions of the patient's history were reviewed and updated as appropriate: allergies, current medications, past social history and problem list.    Outpatient Medications Marked as Taking for the 10/5/22 encounter (Office Visit) with Scott Gonzales MD   Medication Sig Dispense Refill   • Eliquis 2.5 MG tablet tablet TAKE 1 TABLET BY MOUTH TWICE DAILY 180 tablet 1   • sildenafil (VIAGRA) 50 MG tablet Take 1 tablet by mouth Daily As Needed for Erectile Dysfunction. 10 tablet 10       Review of Systems   Constitutional: Negative for chills and fever.   HENT: Positive for postnasal drip, rhinorrhea and sinus pain.    Respiratory: Positive for cough. Negative for shortness of breath.    Neurological: Positive for headaches.       Objective   Vitals:    10/05/22 0842   BP: 121/82   Pulse: 79   Resp: 16   Temp: 97.3 °F (36.3 °C)   SpO2: 99%      Wt Readings from Last 3 Encounters:   10/05/22 79.4 kg (175 lb)   06/20/22 79.8 kg (176 lb)   12/20/21 81.2 kg (179 lb)    Body mass index is 25.11 kg/m².      Physical Exam  Constitutional:       Appearance: Normal appearance. He is well-developed.   HENT:      Right Ear: Tympanic membrane, ear canal and external ear normal.      Left Ear: Tympanic membrane, ear canal and external ear normal.   Eyes:      Conjunctiva/sclera: Conjunctivae normal.      Pupils: Pupils are equal, round, and reactive to light.   Pulmonary:      Effort: Pulmonary effort is  normal. No respiratory distress.      Breath sounds: Normal breath sounds. No wheezing or rales.   Neurological:      Mental Status: He is alert.           Problems Addressed this Visit    None     Visit Diagnoses     Bronchitis    -  Primary      Diagnoses       Codes Comments    Bronchitis    -  Primary ICD-10-CM: J40  ICD-9-CM: 490         Assessment & Plan   In today with 6 weeks of cough and postnasal drip.  Sinus ache in the forehead.  Advil seem to begin the day after he was changing some shutters on his house and had a big drop of bats droppings into his face and perhaps inhaled it as well.  He is worried about histoplasmosis in the setting.  We will send off some fungal titers.  Also get a chest x-ray today.  Statistically this is more likely related to allergic rhinosinusitis.  He he has a long history of chronic allergies in the fall and spring.  We will begin Zyrtec 10 mg daily and add a Medrol Dosepak while were awaiting the above tests.    The above information was reviewed again today 10/05/22.  It continues to be accurate as reflected above and is unchanged.  History, physical and review of systems all reviewed and are unchanged.  Medications were reviewed today and continue the current dosing.    PPE today includes face mask and eye shield.             Dragon disclaimer:   Much of this encounter note is an electronic transcription/translation of spoken language to printed text. The electronic translation of spoken language may permit erroneous, or at times, nonsensical words or phrases to be inadvertently transcribed; Although I have reviewed the note for such errors, some may still exist.

## 2022-10-08 LAB
A FLAVUS AB SER QL ID: NEGATIVE
A FUMIGATUS AB SER QL ID: NEGATIVE
A NIGER AB SER QL ID: NEGATIVE
B DERMAT AB TITR SER: NEGATIVE {TITER}

## 2022-11-11 RX ORDER — APIXABAN 2.5 MG/1
TABLET, FILM COATED ORAL
Qty: 180 TABLET | Refills: 1 | Status: SHIPPED | OUTPATIENT
Start: 2022-11-11

## 2022-12-15 ENCOUNTER — OFFICE VISIT (OUTPATIENT)
Dept: INTERNAL MEDICINE | Facility: CLINIC | Age: 72
End: 2022-12-15

## 2022-12-15 VITALS
HEART RATE: 73 BPM | HEIGHT: 70 IN | OXYGEN SATURATION: 98 % | WEIGHT: 177 LBS | DIASTOLIC BLOOD PRESSURE: 78 MMHG | TEMPERATURE: 97.8 F | RESPIRATION RATE: 18 BRPM | BODY MASS INDEX: 25.34 KG/M2 | SYSTOLIC BLOOD PRESSURE: 126 MMHG

## 2022-12-15 DIAGNOSIS — G47.33 OSA (OBSTRUCTIVE SLEEP APNEA): Chronic | ICD-10-CM

## 2022-12-15 DIAGNOSIS — K21.9 GASTROESOPHAGEAL REFLUX DISEASE WITHOUT ESOPHAGITIS: Primary | Chronic | ICD-10-CM

## 2022-12-15 DIAGNOSIS — R73.02 IGT (IMPAIRED GLUCOSE TOLERANCE): Chronic | ICD-10-CM

## 2022-12-15 PROCEDURE — 99213 OFFICE O/P EST LOW 20 MIN: CPT | Performed by: INTERNAL MEDICINE

## 2022-12-15 NOTE — PROGRESS NOTES
Subjective   Dennis De Anda is a 72 y.o. male.     Chief Complaint   Patient presents with   • IGT    • Heartburn         History of Present Illness  In for recheck of chronic DVT with history of PE.  Just a few small peripheral PEs on CT scan.  Extensive DVT left leg which was proximal femoral lead to thrombolysis and embolectomy.  History of PAD.  History of prostate cancer.  No longer being followed by his urologist who retired.  We are now monitoring his prostate cancer.  Heartburn  He reports no abdominal pain, no chest pain or no wheezing.   Prostate Cancer  This is a chronic problem. The current episode started more than 1 year ago. Pertinent negatives include no abdominal pain or chest pain.   Hyperglycemia  This is a chronic problem. The current episode started more than 1 year ago. The problem occurs constantly. Pertinent negatives include no abdominal pain or chest pain.   Osteoarthritis  Presents for follow-up visit. His past medical history is significant for osteoarthritis.   Sleep Apnea  This is a chronic problem. The current episode started more than 1 year ago. Pertinent negatives include no abdominal pain or chest pain.   Deep Vein Thrombosis  This is a chronic problem. The current episode started more than 1 month ago. The problem has been gradually improving. Pertinent negatives include no abdominal pain or chest pain. The symptoms are aggravated by standing.   Anemia  Presents for follow-up visit. There has been no abdominal pain or palpitations.        The following portions of the patient's history were reviewed and updated as appropriate: allergies, current medications, past social history and problem list.    Outpatient Medications Marked as Taking for the 12/15/22 encounter (Office Visit) with Scott Gonzales MD   Medication Sig Dispense Refill   • Eliquis 2.5 MG tablet tablet TAKE 1 TABLET BY MOUTH TWICE DAILY 180 tablet 1   • sildenafil (VIAGRA) 50 MG tablet Take 1 tablet by mouth  Daily As Needed for Erectile Dysfunction. 10 tablet 10       Review of Systems   Respiratory: Negative for shortness of breath and wheezing.    Cardiovascular: Negative for chest pain, palpitations and leg swelling.   Gastrointestinal: Negative for abdominal pain.       Objective   Vitals:    12/15/22 0953   BP: 126/78   Pulse: 73   Resp: 18   Temp: 97.8 °F (36.6 °C)   SpO2: 98%          12/15/22  0953   Weight: 80.3 kg (177 lb)    [unfilled]  Body mass index is 25.4 kg/m².      Physical Exam   Constitutional: He appears well-developed.   Cardiovascular: Normal rate, regular rhythm and normal heart sounds.   Pulmonary/Chest: Effort normal and breath sounds normal. No respiratory distress. He has no wheezes.   Abdominal: Normal appearance.   Neurological: He is alert.         Problems Addressed this Visit        Endocrine and Metabolic    IGT (impaired glucose tolerance) (Chronic)       Gastrointestinal Abdominal     Gastroesophageal reflux disease without esophagitis - Primary (Chronic)       Sleep    TRIXIE (obstructive sleep apnea) (Chronic)   Diagnoses       Codes Comments    Gastroesophageal reflux disease without esophagitis    -  Primary ICD-10-CM: K21.9  ICD-9-CM: 530.81     IGT (impaired glucose tolerance)     ICD-10-CM: R73.02  ICD-9-CM: 790.22     TRIXIE (obstructive sleep apnea)     ICD-10-CM: G47.33  ICD-9-CM: 327.23         Assessment & Plan   In for recheck of chronic DVT and history of PE today December 2022.  He had small peripheral PE but extensive DVT left leg.  Had thrombolysis and embolectomy.  The DVT occurred following plane ride to [place] followed 3 weeks later by a road trip/car ride to [facility].  He has completed 24 months of eliquis treatment but will plan to continue lifelong low dose eliquis at this point given the severity of his original DVT.  He's using a support hose with travel only at this point.  His peripheral edema is virtually resolved at this point.  We will recheck in 6  months.  Annual lab work will include CBC, CMP, lipids, A1c, PSA, UA June 2022.  Will refill a prescription of Viagra for his ED which is related to his radical prostatectomy for prostate cancer.  Remains on Eliquis 2.5 mg twice daily and sildenafil 40 mg daily as needed and those are reviewed today.   Annual wellness visit is due now.  We will recheck glucose and A1c today.  Flu shot is due today.    The above information was reviewed again today 12/15/22.  It continues to be accurate as reflected above and is unchanged.  History, physical and review of systems all reviewed and are unchanged.  Medications were reviewed today and continue the current dosing.    PPE today includes face mask and eye shield.         Dragon disclaimer:   Much of this encounter note is an electronic transcription/translation of spoken language to printed text. The electronic translation of spoken language may permit erroneous, or at times, nonsensical words or phrases to be inadvertently transcribed; Although I have reviewed the note for such errors, some may still exist.

## 2022-12-16 LAB
GLUCOSE SERPL-MCNC: 87 MG/DL (ref 65–99)
HBA1C MFR BLD: 5.8 % (ref 4.8–5.6)

## 2023-02-24 ENCOUNTER — OFFICE VISIT (OUTPATIENT)
Dept: INTERNAL MEDICINE | Facility: CLINIC | Age: 73
End: 2023-02-24
Payer: MEDICARE

## 2023-02-24 VITALS
SYSTOLIC BLOOD PRESSURE: 125 MMHG | RESPIRATION RATE: 16 BRPM | HEART RATE: 57 BPM | DIASTOLIC BLOOD PRESSURE: 82 MMHG | TEMPERATURE: 97.3 F | BODY MASS INDEX: 25.31 KG/M2 | OXYGEN SATURATION: 97 % | WEIGHT: 176.8 LBS | HEIGHT: 70 IN

## 2023-02-24 DIAGNOSIS — Z00.00 ENCOUNTER FOR ANNUAL WELLNESS EXAM IN MEDICARE PATIENT: Primary | ICD-10-CM

## 2023-02-24 PROCEDURE — 1126F AMNT PAIN NOTED NONE PRSNT: CPT | Performed by: INTERNAL MEDICINE

## 2023-02-24 PROCEDURE — G0439 PPPS, SUBSEQ VISIT: HCPCS | Performed by: INTERNAL MEDICINE

## 2023-02-24 PROCEDURE — 1159F MED LIST DOCD IN RCRD: CPT | Performed by: INTERNAL MEDICINE

## 2023-02-24 PROCEDURE — 1170F FXNL STATUS ASSESSED: CPT | Performed by: INTERNAL MEDICINE

## 2023-02-24 PROCEDURE — 1160F RVW MEDS BY RX/DR IN RCRD: CPT | Performed by: INTERNAL MEDICINE

## 2023-02-24 NOTE — PROGRESS NOTES
The ABCs of the Annual Wellness Visit  Subsequent Medicare Wellness Visit    Matthew De Anda is a 73 y.o. male who presents for a Subsequent Medicare Wellness Visit.    The following portions of the patient's history were reviewed and   updated as appropriate: allergies, current medications, past family history, past medical history, past social history, past surgical history and problem list.    Compared to one year ago, the patient feels his physical   health is better.         Matthew De Anda is a 73 y.o. male.     Chief Complaint   Patient presents with   • Wellness Check         History of Present Illness     The following portions of the patient's history were reviewed and updated as appropriate: allergies, current medications, past social history and problem list.    Outpatient Medications Marked as Taking for the 2/24/23 encounter (Office Visit) with Scott Gonzales MD   Medication Sig Dispense Refill   • Eliquis 2.5 MG tablet tablet TAKE 1 TABLET BY MOUTH TWICE DAILY 180 tablet 1   • sildenafil (VIAGRA) 50 MG tablet Take 1 tablet by mouth Daily As Needed for Erectile Dysfunction. 10 tablet 10       Review of Systems    Objective   Vitals:    02/24/23 1502   BP: 125/82   Pulse: 57   Resp: 16   Temp: 97.3 °F (36.3 °C)   SpO2: 97%      Wt Readings from Last 3 Encounters:   02/24/23 80.2 kg (176 lb 12.8 oz)   12/15/22 80.3 kg (177 lb)   10/05/22 79.4 kg (175 lb)    Body mass index is 25.37 kg/m².      Physical Exam      Problems Addressed this Visit    None  Visit Diagnoses     Encounter for annual wellness exam in Medicare patient    -  Primary      Diagnoses       Codes Comments    Encounter for annual wellness exam in Medicare patient    -  Primary ICD-10-CM: Z00.00  ICD-9-CM: V70.0         Assessment & Plan                Dragon disclaimer:   Part of this note may be an electronic transcription/translation of spoken language to printed text using the Dragon Dictation System.    Compared to one year ago, the patient feels his mental   health is better.    Recent Hospitalizations:  He was not admitted to the hospital during the last year.       Current Medical Providers:  Patient Care Team:  Scott Gonzales MD as PCP - General (Internal Medicine)  Scott Gonzales MD as PCP - Internal Medicine  Justin Acosta MD as Surgeon (Vascular Surgery)    Outpatient Medications Prior to Visit   Medication Sig Dispense Refill   • Eliquis 2.5 MG tablet tablet TAKE 1 TABLET BY MOUTH TWICE DAILY 180 tablet 1   • sildenafil (VIAGRA) 50 MG tablet Take 1 tablet by mouth Daily As Needed for Erectile Dysfunction. 10 tablet 10     No facility-administered medications prior to visit.       No opioid medication identified on active medication list. I have reviewed chart for other potential  high risk medication/s and harmful drug interactions in the elderly.          Aspirin is not on active medication list.  Aspirin use is not indicated based on review of current medical condition/s. Risk of harm outweighs potential benefits.  .    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  ACP discussion was held with the patient during this visit. Patient has an advance directive (not in EMR), copy requested.     Objective    Vitals:    02/24/23 1502   BP: 125/82   BP Location: Left arm   Patient Position: Sitting   Cuff Size: Adult   Pulse: 57  "  Resp: 16   Temp: 97.3 °F (36.3 °C)   TempSrc: Temporal   SpO2: 97%   Weight: 80.2 kg (176 lb 12.8 oz)   Height: 177.8 cm (70\")   PainSc: 0-No pain     Estimated body mass index is 25.37 kg/m² as calculated from the following:    Height as of this encounter: 177.8 cm (70\").    Weight as of this encounter: 80.2 kg (176 lb 12.8 oz).    BMI is >= 25 and <30. (Overweight) The following options were offered after discussion;: weight loss educational material (shared in after visit summary) and exercise counseling/recommendations      Does the patient have evidence of cognitive impairment?   No    Lab Results   Component Value Date    HGBA1C 5.80 (H) 12/15/2022          HEALTH RISK ASSESSMENT    Smoking Status:  Social History     Tobacco Use   Smoking Status Never   Smokeless Tobacco Never     Alcohol Consumption:  Social History     Substance and Sexual Activity   Alcohol Use Yes   • Alcohol/week: 14.0 standard drinks   • Types: 14 Glasses of wine per week    Comment: Beer or wine occas     Fall Risk Screen:    FITZADI Fall Risk Assessment has not been completed.    Depression Screening:  PHQ-2/PHQ-9 Depression Screening 2/24/2023   Little Interest or Pleasure in Doing Things 0-->not at all   Feeling Down, Depressed or Hopeless 0-->not at all   PHQ-9: Brief Depression Severity Measure Score 0       Health Habits and Functional and Cognitive Screening:  Functional & Cognitive Status 2/24/2023   Do you have difficulty preparing food and eating? No   Do you have difficulty bathing yourself, getting dressed or grooming yourself? No   Do you have difficulty using the toilet? No   Do you have difficulty moving around from place to place? No   Do you have trouble with steps or getting out of a bed or a chair? No   Current Diet Well Balanced Diet   Dental Exam Up to date   Eye Exam Up to date   Exercise (times per week) 7 times per week   Current Exercises Include No Regular Exercise   Current Exercise Activities Include -   Do " you need help using the phone?  No   Are you deaf or do you have serious difficulty hearing?  No   Do you need help with transportation? No   Do you need help shopping? No   Do you need help preparing meals?  No   Do you need help with housework?  No   Do you need help with laundry? No   Do you need help taking your medications? No   Do you need help managing money? No   Do you ever drive or ride in a car without wearing a seat belt? No   Have you felt unusual stress, anger or loneliness in the last month? No   Who do you live with? Spouse   If you need help, do you have trouble finding someone available to you? No   Have you been bothered in the last four weeks by sexual problems? No   Do you have difficulty concentrating, remembering or making decisions? No       Age-appropriate Screening Schedule:  Refer to the list below for future screening recommendations based on patient's age, sex and/or medical conditions. Orders for these recommended tests are listed in the plan section. The patient has been provided with a written plan.    Health Maintenance   Topic Date Due   • INFLUENZA VACCINE  08/01/2022   • COVID-19 Vaccine (4 - Booster for Moderna series) 01/01/2052 (Originally 1/26/2022)   • LIPID PANEL  06/20/2023   • COLORECTAL CANCER SCREENING  02/02/2024   • ANNUAL WELLNESS VISIT  02/24/2024   • TDAP/TD VACCINES (4 - Td or Tdap) 10/18/2026   • HEPATITIS C SCREENING  Completed   • Pneumococcal Vaccine 65+  Completed   • ZOSTER VACCINE  Completed                CMS Preventative Services Quick Reference  Risk Factors Identified During Encounter:    None Identified    The above risks/problems have been discussed with the patient.  Pertinent information has been shared with the patient in the After Visit Summary.    Diagnoses and all orders for this visit:    1. Encounter for annual wellness exam in Medicare patient (Primary)        Follow Up:   Next Medicare Wellness visit to be scheduled in 1 year.      An After  Visit Summary and PPPS were made available to the patient.

## 2023-04-28 DIAGNOSIS — R73.02 IGT (IMPAIRED GLUCOSE TOLERANCE): Primary | ICD-10-CM

## 2023-04-28 DIAGNOSIS — E78.00 HIGH CHOLESTEROL: ICD-10-CM

## 2023-04-28 DIAGNOSIS — D64.9 ANEMIA, UNSPECIFIED TYPE: ICD-10-CM

## 2023-04-28 DIAGNOSIS — C61 PROSTATE CANCER: ICD-10-CM

## 2023-05-05 LAB
ALBUMIN SERPL-MCNC: 4.7 G/DL (ref 3.7–4.7)
ALBUMIN/GLOB SERPL: 1.8 {RATIO} (ref 1.2–2.2)
ALP SERPL-CCNC: 66 IU/L (ref 44–121)
ALT SERPL-CCNC: 24 IU/L (ref 0–44)
AST SERPL-CCNC: 28 IU/L (ref 0–40)
BASOPHILS # BLD AUTO: 0 X10E3/UL (ref 0–0.2)
BASOPHILS NFR BLD AUTO: 0 %
BILIRUB SERPL-MCNC: 0.5 MG/DL (ref 0–1.2)
BUN SERPL-MCNC: 18 MG/DL (ref 8–27)
BUN/CREAT SERPL: 14 (ref 10–24)
CALCIUM SERPL-MCNC: 9.5 MG/DL (ref 8.6–10.2)
CHLORIDE SERPL-SCNC: 109 MMOL/L (ref 96–106)
CHOLEST SERPL-MCNC: 273 MG/DL (ref 100–199)
CHOLEST/HDLC SERPL: 2.8 RATIO (ref 0–5)
CO2 SERPL-SCNC: 24 MMOL/L (ref 20–29)
CREAT SERPL-MCNC: 1.33 MG/DL (ref 0.76–1.27)
EGFRCR SERPLBLD CKD-EPI 2021: 57 ML/MIN/1.73
EOSINOPHIL # BLD AUTO: 0.1 X10E3/UL (ref 0–0.4)
EOSINOPHIL NFR BLD AUTO: 1 %
ERYTHROCYTE [DISTWIDTH] IN BLOOD BY AUTOMATED COUNT: 12 % (ref 11.6–15.4)
GLOBULIN SER CALC-MCNC: 2.6 G/DL (ref 1.5–4.5)
GLUCOSE SERPL-MCNC: 77 MG/DL (ref 70–99)
HBA1C MFR BLD: 5.6 % (ref 4.8–5.6)
HCT VFR BLD AUTO: 44.7 % (ref 37.5–51)
HDLC SERPL-MCNC: 97 MG/DL
HGB BLD-MCNC: 15.1 G/DL (ref 13–17.7)
IMM GRANULOCYTES # BLD AUTO: 0 X10E3/UL (ref 0–0.1)
IMM GRANULOCYTES NFR BLD AUTO: 0 %
LDLC SERPL CALC-MCNC: 167 MG/DL (ref 0–99)
LYMPHOCYTES # BLD AUTO: 1.4 X10E3/UL (ref 0.7–3.1)
LYMPHOCYTES NFR BLD AUTO: 27 %
MCH RBC QN AUTO: 31.2 PG (ref 26.6–33)
MCHC RBC AUTO-ENTMCNC: 33.8 G/DL (ref 31.5–35.7)
MCV RBC AUTO: 92 FL (ref 79–97)
MONOCYTES # BLD AUTO: 0.6 X10E3/UL (ref 0.1–0.9)
MONOCYTES NFR BLD AUTO: 11 %
NEUTROPHILS # BLD AUTO: 3.3 X10E3/UL (ref 1.4–7)
NEUTROPHILS NFR BLD AUTO: 61 %
PLATELET # BLD AUTO: 197 X10E3/UL (ref 150–450)
POTASSIUM SERPL-SCNC: 5 MMOL/L (ref 3.5–5.2)
PROT SERPL-MCNC: 7.3 G/DL (ref 6–8.5)
PSA SERPL-MCNC: 0.1 NG/ML (ref 0–4)
RBC # BLD AUTO: 4.84 X10E6/UL (ref 4.14–5.8)
SODIUM SERPL-SCNC: 148 MMOL/L (ref 134–144)
TRIGL SERPL-MCNC: 58 MG/DL (ref 0–149)
VLDLC SERPL CALC-MCNC: 9 MG/DL (ref 5–40)
WBC # BLD AUTO: 5.4 X10E3/UL (ref 3.4–10.8)

## 2023-05-09 RX ORDER — APIXABAN 2.5 MG/1
TABLET, FILM COATED ORAL
Qty: 180 TABLET | Refills: 1 | Status: SHIPPED | OUTPATIENT
Start: 2023-05-09

## 2023-06-19 ENCOUNTER — OFFICE VISIT (OUTPATIENT)
Dept: INTERNAL MEDICINE | Facility: CLINIC | Age: 73
End: 2023-06-19
Payer: MEDICARE

## 2023-06-19 VITALS
HEIGHT: 70 IN | OXYGEN SATURATION: 97 % | BODY MASS INDEX: 25.2 KG/M2 | DIASTOLIC BLOOD PRESSURE: 72 MMHG | HEART RATE: 57 BPM | SYSTOLIC BLOOD PRESSURE: 130 MMHG | RESPIRATION RATE: 16 BRPM | WEIGHT: 176 LBS | TEMPERATURE: 98 F

## 2023-06-19 DIAGNOSIS — I82.512 CHRONIC DEEP VEIN THROMBOSIS (DVT) OF FEMORAL VEIN OF LEFT LOWER EXTREMITY: ICD-10-CM

## 2023-06-19 DIAGNOSIS — M17.0 PRIMARY OSTEOARTHRITIS OF BOTH KNEES: Chronic | ICD-10-CM

## 2023-06-19 DIAGNOSIS — R73.02 IGT (IMPAIRED GLUCOSE TOLERANCE): Primary | Chronic | ICD-10-CM

## 2023-06-19 DIAGNOSIS — Z79.899 MEDICATION MANAGEMENT: ICD-10-CM

## 2023-06-19 PROCEDURE — 1159F MED LIST DOCD IN RCRD: CPT | Performed by: INTERNAL MEDICINE

## 2023-06-19 PROCEDURE — 99214 OFFICE O/P EST MOD 30 MIN: CPT | Performed by: INTERNAL MEDICINE

## 2023-06-19 PROCEDURE — 1160F RVW MEDS BY RX/DR IN RCRD: CPT | Performed by: INTERNAL MEDICINE

## 2023-06-19 NOTE — PROGRESS NOTES
Subjective   Dennis De Anda is a 73 y.o. male.     Chief Complaint   Patient presents with    Hyperglycemia    Hypertension    Deep Vein Thrombosis    Sleep Apnea         History of Present Illness  In for recheck of chronic DVT with history of PE.  Just a few small peripheral PEs on CT scan.  Extensive DVT left leg which was proximal femoral lead to thrombolysis and embolectomy.  History of PAD.  History of prostate cancer.  No longer being followed by his urologist who retired.  We are now monitoring his prostate cancer.  Hyperglycemia  This is a chronic problem. The current episode started more than 1 year ago. The problem occurs constantly.   Deep Vein Thrombosis  This is a chronic problem. The current episode started more than 1 month ago. The problem has been gradually improving. The symptoms are aggravated by standing.   Sleep Apnea  This is a chronic problem. The current episode started more than 1 year ago.   Heartburn  He reports no wheezing.   Prostate Cancer  This is a chronic problem. The current episode started more than 1 year ago.   Osteoarthritis  Presents for follow-up visit. His past medical history is significant for osteoarthritis.      The following portions of the patient's history were reviewed and updated as appropriate: allergies, current medications, past social history and problem list.    Outpatient Medications Marked as Taking for the 6/19/23 encounter (Office Visit) with Scott Gonzales MD   Medication Sig Dispense Refill    Eliquis 2.5 MG tablet tablet TAKE 1 TABLET BY MOUTH TWICE DAILY 180 tablet 1    sildenafil (VIAGRA) 50 MG tablet Take 1 tablet by mouth Daily As Needed for Erectile Dysfunction. 10 tablet 10       Review of Systems   Respiratory:  Negative for wheezing.    Cardiovascular:  Negative for leg swelling.     Objective   Vitals:    06/19/23 1122   BP: 130/72   Pulse: 57   Resp: 16   Temp: 98 °F (36.7 °C)   SpO2: 97%          06/19/23  1122   Weight: 79.8 kg (176 lb)     [unfilled]  Body mass index is 25.25 kg/m².      Physical Exam   Constitutional: He appears well-developed.   Cardiovascular: Normal rate, regular rhythm and normal heart sounds.   Pulmonary/Chest: Effort normal and breath sounds normal. No respiratory distress. He has no wheezes.   Abdominal: Normal appearance.   Neurological: He is alert.       Problems Addressed this Visit          Coag and Thromboembolic    Chronic deep vein thrombosis (DVT) of femoral vein of left lower extremity       Endocrine and Metabolic    IGT (impaired glucose tolerance) - Primary (Chronic)       Musculoskeletal and Injuries    OA (osteoarthritis) (Chronic)     Diagnoses         Codes Comments    IGT (impaired glucose tolerance)    -  Primary ICD-10-CM: R73.02  ICD-9-CM: 790.22     Chronic deep vein thrombosis (DVT) of femoral vein of left lower extremity     ICD-10-CM: I82.512  ICD-9-CM: 453.51     Primary osteoarthritis of both knees     ICD-10-CM: M17.0  ICD-9-CM: 715.16           Assessment & Plan   In for recheck of chronic DVT and history of PE today June 2023.  He had small peripheral PE but extensive DVT left leg.  Had thrombolysis and embolectomy.  The DVT occurred following plane ride to [place] followed 3 weeks later by a road trip/car ride to [facility].  He has completed 24 months of eliquis treatment but will plan to continue lifelong low dose eliquis at this point given the severity of his original DVT.  He's using a support hose with travel only at this point.  His peripheral edema is virtually resolved at this point.  We will recheck in 6 months.  Annual lab work today June 2023 included CBC, CMP, lipids, A1c, PSA, UA.  Will refill a prescription of Viagra for his ED which is related to his radical prostatectomy for prostate cancer.  Remains on Eliquis 2.5 mg twice daily and sildenafil 40 mg daily as needed and those are reviewed today.   Annual wellness visit is due February 2024.  We will recheck glucose  and A1c every 6 months.  BMP next time since creatinine was up this time.    The above information was reviewed again today 06/19/23.  It continues to be accurate as reflected above and is unchanged.  History, physical and review of systems all reviewed and are unchanged.  Medications were reviewed today and continue the current dosing.         Dragon disclaimer:   Much of this encounter note is an electronic transcription/translation of spoken language to printed text. The electronic translation of spoken language may permit erroneous, or at times, nonsensical words or phrases to be inadvertently transcribed; Although I have reviewed the note for such errors, some may still exist.

## 2023-11-03 RX ORDER — APIXABAN 2.5 MG/1
TABLET, FILM COATED ORAL
Qty: 180 TABLET | Refills: 1 | Status: SHIPPED | OUTPATIENT
Start: 2023-11-03

## 2023-12-18 ENCOUNTER — LAB (OUTPATIENT)
Dept: LAB | Facility: HOSPITAL | Age: 73
End: 2023-12-18
Payer: MEDICARE

## 2023-12-18 ENCOUNTER — OFFICE VISIT (OUTPATIENT)
Dept: INTERNAL MEDICINE | Facility: CLINIC | Age: 73
End: 2023-12-18
Payer: MEDICARE

## 2023-12-18 ENCOUNTER — HOSPITAL ENCOUNTER (OUTPATIENT)
Dept: GENERAL RADIOLOGY | Facility: HOSPITAL | Age: 73
Discharge: HOME | End: 2023-12-18
Payer: MEDICARE

## 2023-12-18 VITALS
HEART RATE: 55 BPM | TEMPERATURE: 98.3 F | BODY MASS INDEX: 24.62 KG/M2 | HEIGHT: 70 IN | RESPIRATION RATE: 16 BRPM | WEIGHT: 172 LBS | DIASTOLIC BLOOD PRESSURE: 72 MMHG | SYSTOLIC BLOOD PRESSURE: 130 MMHG | OXYGEN SATURATION: 99 %

## 2023-12-18 DIAGNOSIS — B35.1 ONYCHOMYCOSIS: ICD-10-CM

## 2023-12-18 DIAGNOSIS — M50.30 DDD (DEGENERATIVE DISC DISEASE), CERVICAL: ICD-10-CM

## 2023-12-18 DIAGNOSIS — N52.8 OTHER MALE ERECTILE DYSFUNCTION: Primary | ICD-10-CM

## 2023-12-18 DIAGNOSIS — R73.02 IGT (IMPAIRED GLUCOSE TOLERANCE): ICD-10-CM

## 2023-12-18 DIAGNOSIS — Z79.899 MEDICATION MANAGEMENT: ICD-10-CM

## 2023-12-18 LAB
ANION GAP SERPL CALCULATED.3IONS-SCNC: 10.9 MMOL/L (ref 5–15)
BUN SERPL-MCNC: 19 MG/DL (ref 8–23)
BUN/CREAT SERPL: 19 (ref 7–25)
CALCIUM SPEC-SCNC: 9.2 MG/DL (ref 8.6–10.5)
CHLORIDE SERPL-SCNC: 106 MMOL/L (ref 98–107)
CO2 SERPL-SCNC: 25.1 MMOL/L (ref 22–29)
CREAT SERPL-MCNC: 1 MG/DL (ref 0.76–1.27)
EGFRCR SERPLBLD CKD-EPI 2021: 80 ML/MIN/1.73
GLUCOSE SERPL-MCNC: 81 MG/DL (ref 65–99)
HBA1C MFR BLD: 5.6 % (ref 4.8–5.6)
POTASSIUM SERPL-SCNC: 4.2 MMOL/L (ref 3.5–5.2)
SODIUM SERPL-SCNC: 142 MMOL/L (ref 136–145)

## 2023-12-18 PROCEDURE — 99214 OFFICE O/P EST MOD 30 MIN: CPT | Performed by: INTERNAL MEDICINE

## 2023-12-18 PROCEDURE — 72050 X-RAY EXAM NECK SPINE 4/5VWS: CPT

## 2023-12-18 PROCEDURE — 1159F MED LIST DOCD IN RCRD: CPT | Performed by: INTERNAL MEDICINE

## 2023-12-18 PROCEDURE — 83036 HEMOGLOBIN GLYCOSYLATED A1C: CPT | Performed by: INTERNAL MEDICINE

## 2023-12-18 PROCEDURE — 80048 BASIC METABOLIC PNL TOTAL CA: CPT | Performed by: INTERNAL MEDICINE

## 2023-12-18 PROCEDURE — 1160F RVW MEDS BY RX/DR IN RCRD: CPT | Performed by: INTERNAL MEDICINE

## 2023-12-18 PROCEDURE — 36415 COLL VENOUS BLD VENIPUNCTURE: CPT | Performed by: INTERNAL MEDICINE

## 2023-12-18 RX ORDER — TADALAFIL 20 MG/1
20 TABLET ORAL DAILY PRN
Qty: 10 TABLET | Refills: 10 | Status: SHIPPED | OUTPATIENT
Start: 2023-12-18

## 2023-12-18 NOTE — PROGRESS NOTES
Subjective   Dennis De Anda is a 73 y.o. male.     Chief Complaint   Patient presents with    PVC    Hyperglycemia    Neck Pain    Nail Problem         History of Present Illness  In for recheck of chronic DVT with history of PE.  Just a few small peripheral PEs on CT scan.  Extensive DVT left leg which was proximal femoral lead to thrombolysis and embolectomy.  History of PAD.  History of prostate cancer.  No longer being followed by his urologist who retired.  We are now monitoring his prostate cancer.    Hyperglycemia  This is a chronic problem. The current episode started more than 1 year ago. The problem occurs constantly. Associated symptoms include neck pain. Pertinent negatives include no chest pain.   Neck Pain   This is a chronic problem. The current episode started more than 1 month ago. The problem has been waxing and waning. The pain is present in the occipital region. Pertinent negatives include no chest pain.   Deep Vein Thrombosis  This is a chronic problem. The current episode started more than 1 month ago. The problem has been gradually improving. Associated symptoms include neck pain. Pertinent negatives include no chest pain. The symptoms are aggravated by standing.   Heartburn  He reports no chest pain or no wheezing.   Prostate Cancer  This is a chronic problem. The current episode started more than 1 year ago. Associated symptoms include neck pain. Pertinent negatives include no chest pain.        The following portions of the patient's history were reviewed and updated as appropriate: allergies, current medications, past social history and problem list.    Outpatient Medications Marked as Taking for the 12/18/23 encounter (Office Visit) with Scott Gonzales MD   Medication Sig Dispense Refill    Eliquis 2.5 MG tablet tablet TAKE 1 TABLET BY MOUTH TWICE DAILY 180 tablet 1    sildenafil (VIAGRA) 50 MG tablet TAKE 1 TABLET BY MOUTH DAILY AS NEEDED FOR ERECTILE DYSFUNCTION 10 tablet 10        Review of Systems   Respiratory:  Negative for shortness of breath and wheezing.    Cardiovascular:  Negative for chest pain, palpitations and leg swelling.   Musculoskeletal:  Positive for neck pain.       Objective   Vitals:    12/18/23 0945   BP: 130/72   Pulse: 55   Resp: 16   Temp: 98.3 °F (36.8 °C)   SpO2: 99%          12/18/23 0945   Weight: 78 kg (172 lb)    [unfilled]  Body mass index is 24.68 kg/m².      Physical Exam   Constitutional: He appears well-developed.   Cardiovascular: Normal rate, regular rhythm and normal heart sounds.   Pulmonary/Chest: Effort normal and breath sounds normal. No respiratory distress. He has no wheezes.   Abdominal: Normal appearance.   Neurological: He is alert.   Skin: Lesion noted.         Problems Addressed this Visit          Genitourinary and Reproductive     Other male erectile dysfunction - Primary (Chronic)     Other Visit Diagnoses       Onychomycosis        DDD (degenerative disc disease), cervical              Diagnoses         Codes Comments    Other male erectile dysfunction    -  Primary ICD-10-CM: N52.8  ICD-9-CM: 607.84     Onychomycosis     ICD-10-CM: B35.1  ICD-9-CM: 110.1     DDD (degenerative disc disease), cervical     ICD-10-CM: M50.30  ICD-9-CM: 722.4           Assessment & Plan   In for recheck of DVT and history of PE today December 2023.  In the last 2 months he has had some neck symptoms.  Some crepitus in his neck and recently some pain radiating down to the left shoulder.  He has been having issues with his toenails.  The great nails have some thickening and whitish areas on the medial aspects.  They almost look traumatic but he is concerned about onychomycosis.  Will go ahead and treat with a topical and see how he does.  He is having continued issues with ED.  He has been on sildenafil without adequate success.  Would like to try Cialis which is fine.  In for recheck of chronic DVT and history of PE today June 2023.  He had small  peripheral PE but extensive DVT left leg.  Had thrombolysis and embolectomy.  The DVT occurred following plane ride to [place] followed 3 weeks later by a road trip/car ride to [facility].  He has completed 24 months of eliquis treatment but will plan to continue lifelong low dose eliquis at this point given the severity of his original DVT.  He's using a support hose with travel only at this point.  His peripheral edema is virtually resolved at this point.  We will recheck in 6 months.  Annual lab work June 2023 included CBC, CMP, lipids, A1c, PSA, UA.  Will begin on Cialis in place of sildenafil for his ED which is related to his radical prostatectomy for prostate cancer.  Remains on Eliquis 2.5 mg twice daily and sildenafil 40 mg daily as needed and those are reviewed today.   Annual wellness visit is due February 2024.  We will recheck glucose and A1c every 6 months.  BMP next time since creatinine was up this time.    The above information was reviewed again today 12/18/23.  It continues to be accurate as reflected above and is unchanged.  History, physical and review of systems all reviewed and are unchanged.  Medications were reviewed today and continue the current dosing.           Rudolph disclaimer:   Much of this encounter note is an electronic transcription/translation of spoken language to printed text. The electronic translation of spoken language may permit erroneous, or at times, nonsensical words or phrases to be inadvertently transcribed; Although I have reviewed the note for such errors, some may still exist.

## 2024-02-06 ENCOUNTER — TELEPHONE (OUTPATIENT)
Dept: INTERNAL MEDICINE | Facility: CLINIC | Age: 74
End: 2024-02-06
Payer: MEDICARE

## 2024-02-06 DIAGNOSIS — Z12.11 ENCOUNTER FOR SCREENING COLONOSCOPY: Primary | ICD-10-CM

## 2024-02-06 NOTE — TELEPHONE ENCOUNTER
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 THEY ARE ALSO NEEDING A HISTORY, RESULTS AND PATH REPORT FROM LAST COLONOSCOPY     "

## 2024-02-06 NOTE — TELEPHONE ENCOUNTER
Okay to make the referral and send the results and path report of the last colonoscopy.  Actually those information should be available to Dr. Jimenez online within [facility].

## 2024-02-26 ENCOUNTER — TELEPHONE (OUTPATIENT)
Dept: GASTROENTEROLOGY | Facility: CLINIC | Age: 74
End: 2024-02-26
Payer: MEDICARE

## 2024-02-26 NOTE — TELEPHONE ENCOUNTER
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 PATIENT ALSO HAS QUESTION IN REGARDS TO THE BILLING OF PROCEDURE AS IT RELATES TO PREVIOUSLY FOUND POLYPS.  PLEASE REACH OUT TO SCHEDULE. THANK YOU   "

## 2024-04-02 ENCOUNTER — TELEPHONE (OUTPATIENT)
Dept: GASTROENTEROLOGY | Facility: CLINIC | Age: 74
End: 2024-04-02
Payer: MEDICARE

## 2024-04-02 NOTE — TELEPHONE ENCOUNTER
LAST C/S  2/2/21    IN EPIC    PERSONAL HX OF POLYPS    NO FAMILY HX OF POLYPS    NO FAMILY HX OF COLON CA            LIST OF  MEDICATIONS              OA QUESTIONNAIRE SCANNED IN MEDIA

## 2024-04-07 ENCOUNTER — PREP FOR SURGERY (OUTPATIENT)
Dept: OTHER | Facility: HOSPITAL | Age: 74
End: 2024-04-07
Payer: MEDICARE

## 2024-04-07 DIAGNOSIS — Z12.11 ENCOUNTER FOR SCREENING FOR MALIGNANT NEOPLASM OF COLON: Primary | ICD-10-CM

## 2024-04-18 ENCOUNTER — TELEPHONE (OUTPATIENT)
Dept: GASTROENTEROLOGY | Facility: CLINIC | Age: 74
End: 2024-04-18
Payer: MEDICARE

## 2024-04-18 NOTE — TELEPHONE ENCOUNTER
[facility] - FOR PSC WITH PROVIDER - PSC WILL CALL WITH ARRIVE TIME A WEEK PRIOR - DATE IS   06/11/2024

## 2024-04-18 NOTE — TELEPHONE ENCOUNTER
"HUB STAFF ATTEMPTED TO FOLLOW WARM TRANSFER PROCESS BUT WAS UNSUCCESSFUL.     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

## 2024-04-30 RX ORDER — APIXABAN 2.5 MG/1
TABLET, FILM COATED ORAL
Qty: 180 TABLET | Refills: 1 | Status: SHIPPED | OUTPATIENT
Start: 2024-04-30

## 2024-06-11 ENCOUNTER — LAB REQUISITION (OUTPATIENT)
Dept: LAB | Facility: HOSPITAL | Age: 74
End: 2024-06-11
Payer: MEDICARE

## 2024-06-11 DIAGNOSIS — Z12.11 SCREENING FOR COLON CANCER: ICD-10-CM

## 2024-06-11 PROBLEM — Z86.010 HISTORY OF COLON POLYPS: Status: ACTIVE | Noted: 2024-06-11

## 2024-06-11 PROBLEM — Z86.0100 HISTORY OF COLON POLYPS: Status: ACTIVE | Noted: 2024-06-11

## 2024-06-11 PROCEDURE — 88305 TISSUE EXAM BY PATHOLOGIST: CPT | Performed by: INTERNAL MEDICINE

## 2024-06-12 LAB
LAB AP CASE REPORT: NORMAL
PATH REPORT.FINAL DX SPEC: NORMAL
PATH REPORT.GROSS SPEC: NORMAL

## 2024-06-18 ENCOUNTER — OFFICE VISIT (OUTPATIENT)
Dept: INTERNAL MEDICINE | Facility: CLINIC | Age: 74
End: 2024-06-18
Payer: MEDICARE

## 2024-06-18 VITALS
HEART RATE: 53 BPM | OXYGEN SATURATION: 98 % | DIASTOLIC BLOOD PRESSURE: 60 MMHG | HEIGHT: 70 IN | WEIGHT: 175 LBS | BODY MASS INDEX: 25.05 KG/M2 | TEMPERATURE: 98.2 F | SYSTOLIC BLOOD PRESSURE: 108 MMHG | RESPIRATION RATE: 18 BRPM

## 2024-06-18 DIAGNOSIS — Z79.899 MEDICATION MANAGEMENT: ICD-10-CM

## 2024-06-18 DIAGNOSIS — R73.02 IGT (IMPAIRED GLUCOSE TOLERANCE): Chronic | ICD-10-CM

## 2024-06-18 DIAGNOSIS — I82.512 CHRONIC DEEP VEIN THROMBOSIS (DVT) OF FEMORAL VEIN OF LEFT LOWER EXTREMITY: ICD-10-CM

## 2024-06-18 DIAGNOSIS — Z00.00 ENCOUNTER FOR ANNUAL WELLNESS EXAM IN MEDICARE PATIENT: Primary | ICD-10-CM

## 2024-06-18 DIAGNOSIS — Z85.46 HISTORY OF PROSTATE CANCER: Chronic | ICD-10-CM

## 2024-06-18 PROBLEM — Z86.0101 HISTORY OF ADENOMATOUS POLYP OF COLON: Status: ACTIVE | Noted: 2024-06-11

## 2024-06-18 PROCEDURE — G0439 PPPS, SUBSEQ VISIT: HCPCS | Performed by: INTERNAL MEDICINE

## 2024-06-18 PROCEDURE — 99213 OFFICE O/P EST LOW 20 MIN: CPT | Performed by: INTERNAL MEDICINE

## 2024-06-18 PROCEDURE — 1126F AMNT PAIN NOTED NONE PRSNT: CPT | Performed by: INTERNAL MEDICINE

## 2024-06-18 PROCEDURE — 1159F MED LIST DOCD IN RCRD: CPT | Performed by: INTERNAL MEDICINE

## 2024-06-18 PROCEDURE — 1160F RVW MEDS BY RX/DR IN RCRD: CPT | Performed by: INTERNAL MEDICINE

## 2024-06-18 NOTE — PROGRESS NOTES
Subjective   Dennis De Anda is a 74 y.o. male.     Chief Complaint   Patient presents with    Medicare Wellness-subsequent    Hyperglycemia         History of Present Illness  In for recheck of chronic DVT with history of PE.  Just a few small peripheral PEs on CT scan.  Extensive DVT left leg which was proximal femoral lead to thrombolysis and embolectomy.  History of PAD.  History of prostate cancer.  No longer being followed by his urologist who retired.  We are now monitoring his prostate cancer.    Hyperglycemia  This is a chronic problem. The current episode started more than 1 year ago. The problem occurs constantly. Associated symptoms include neck pain.   Neck Pain   This is a chronic problem. The current episode started more than 1 month ago. The problem has been waxing and waning. The pain is present in the occipital region.   Deep Vein Thrombosis  This is a chronic problem. The current episode started more than 1 month ago. The problem has been gradually improving. Associated symptoms include neck pain. The symptoms are aggravated by standing.        The following portions of the patient's history were reviewed and updated as appropriate: allergies, current medications, past social history and problem list.    Outpatient Medications Marked as Taking for the 6/18/24 encounter (Office Visit) with Scott Gonzales MD   Medication Sig Dispense Refill    Eliquis 2.5 MG tablet tablet TAKE 1 TABLET BY MOUTH TWICE DAILY 180 tablet 1    tadalafil (Cialis) 20 MG tablet Take 1 tablet by mouth Daily As Needed for Erectile Dysfunction. 10 tablet 10       Review of Systems   Respiratory:  Negative for shortness of breath.    Cardiovascular:  Negative for palpitations and leg swelling.   Musculoskeletal:  Positive for neck pain.       Objective   There were no vitals filed for this visit.     There were no vitals filed for this visit.   [unfilled]  There is no height or weight on file to calculate  BMI.      Physical Exam   Constitutional: He appears well-developed.   Cardiovascular: Normal rate, regular rhythm and normal heart sounds. Exam reveals no gallop.   No murmur heard.  Pulmonary/Chest: Effort normal and breath sounds normal. No respiratory distress. He has no wheezes. He has no rales.   Abdominal: Normal appearance.   Neurological: He is alert.         Problems Addressed this Visit          Coag and Thromboembolic    Chronic deep vein thrombosis (DVT) of femoral vein of left lower extremity       Endocrine and Metabolic    IGT (impaired glucose tolerance) (Chronic)     Other Visit Diagnoses       Encounter for annual wellness exam in Medicare patient    -  Primary          Diagnoses         Codes Comments    Encounter for annual wellness exam in Medicare patient    -  Primary ICD-10-CM: Z00.00  ICD-9-CM: V70.0     IGT (impaired glucose tolerance)     ICD-10-CM: R73.02  ICD-9-CM: 790.22     Chronic deep vein thrombosis (DVT) of femoral vein of left lower extremity     ICD-10-CM: I82.512  ICD-9-CM: 453.51           Assessment & Plan   In for recheck of DVT and history of PE today June 2024.  Neck pain is doing okay as long as he does his physical therapy and exercises.  Symptoms of not cleared 100% however.  He has been having issues with his toenails.  The great nails have some thickening and whitish areas on the medial aspects.  They almost look traumatic but he is concerned about onychomycosis.  Will go ahead and treat with a topical and see how he does.  He is having continued issues with ED.  He tried Cialis without benefit and also had prescription on Cialis and he had significant.  He had small peripheral PE but extensive DVT left leg.  Had thrombolysis and embolectomy.  The DVT occurred following plane ride to [place] followed 3 weeks later by a road trip/car ride to [facility].  He has completed 24 months of eliquis treatment but will plan to continue lifelong low dose eliquis at this  point given the severity of his original DVT.  He's using a support hose with travel only at this point.  His peripheral edema is virtually resolved at this point.  We will recheck in 6 months.  Annual lab work today June 2024 included CBC, CMP, lipids, A1c, PSA, UA.  Remains on Eliquis 2.5 mg twice daily and sildenafil 40 mg daily as needed and those are reviewed today.   Annual wellness visit is due today June 2024.  We will recheck glucose and A1c every 6 months.      The above information was reviewed again today 06/18/24.  It continues to be accurate as reflected above and is unchanged.  History, physical and review of systems all reviewed and are unchanged.  Medications were reviewed today and continue the current dosing.         Dragon disclaimer:   Much of this encounter note is an electronic transcription/translation of spoken language to printed text. The electronic translation of spoken language may permit erroneous, or at times, nonsensical words or phrases to be inadvertently transcribed; Although I have reviewed the note for such errors, some may still exist.

## 2024-06-18 NOTE — PROGRESS NOTES
The ABCs of the Annual Wellness Visit  Subsequent Medicare Wellness Visit    Subjective      Dennis De Anda is a 74 y.o. male who presents for a Subsequent Medicare Wellness Visit.    The following portions of the patient's history were reviewed and   updated as appropriate: allergies, current medications, past family history, past medical history, past social history, past surgical history, and problem list.    Compared to one year ago, the patient feels his physical   health is the same.    Compared to one year ago, the patient feels his mental   health is the same.    Recent Hospitalizations:  He was not admitted to the hospital during the last year.       Current Medical Providers:  Patient Care Team:  Scott Gonzales MD as PCP - General (Internal Medicine)  Scott Gonzales MD as PCP - Internal Medicine  Justin Acosta MD as Surgeon (Vascular Surgery)  Jamie Nuñez MD as Consulting Physician (Gastroenterology)  Reese Glover MD as Consulting Physician (Orthopedic Surgery)  Nikhil Mera MD as Consulting Physician (Otolaryngology)  Ronaldo Lowry OD (Optometry)  Cosme Herrera MD (Dermatology)  Ramiro Cassidy MD as Consulting Physician (Cardiology)    Outpatient Medications Prior to Visit   Medication Sig Dispense Refill    Eliquis 2.5 MG tablet tablet TAKE 1 TABLET BY MOUTH TWICE DAILY 180 tablet 1    tadalafil (Cialis) 20 MG tablet Take 1 tablet by mouth Daily As Needed for Erectile Dysfunction. 10 tablet 10    Fluconazole 2 %, Terbinafine HCl 3 %, Ibuprofen 3 %, vitamin D 50,000 Units, Dimethyl Sulfoxide 50 % Apply 1 mL topically to the appropriate area as directed 2 (Two) Times a Day. 30 mL 2     No facility-administered medications prior to visit.       No opioid medication identified on active medication list. I have reviewed chart for other potential  high risk medication/s and harmful drug interactions in the elderly.        Aspirin is not on active medication list.   "Aspirin use is not indicated based on review of current medical condition/s. Risk of harm outweighs potential benefits.  .    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  ACP discussion was held with the patient during this visit. Patient has an advance directive (not in EMR), copy requested.     Objective    Vitals:    06/18/24 1034   BP: 108/60   Pulse: 53   Resp: 18   Temp: 98.2 °F (36.8 °C)   TempSrc: Temporal   SpO2: 98%   Weight: 79.4 kg (175 lb)   Height: 177.8 cm (70\")     Estimated body mass index is 25.11 kg/m² as calculated from the following:    Height as of this encounter: 177.8 cm (70\").    Weight as of this encounter: 79.4 kg (175 lb).    BMI is within normal parameters. No other follow-up for BMI required.      Does the patient have evidence of cognitive impairment?   No            HEALTH RISK ASSESSMENT    Smoking Status:  Social History     Tobacco Use   Smoking Status Never   Smokeless Tobacco Never     Alcohol Consumption:  Social History     Substance and Sexual Activity   Alcohol Use Yes    Alcohol/week: 14.0 standard drinks of alcohol    Types: 14 Glasses of wine per week    Comment: Beer or wine occas     Fall Risk Screen:    STEADI Fall Risk Assessment was completed, and patient is at LOW risk for " falls.Assessment completed on:2024    Depression Screenin/18/2024    10:05 AM   PHQ-2/PHQ-9 Depression Screening   Little Interest or Pleasure in Doing Things 0-->not at all   Feeling Down, Depressed or Hopeless 0-->not at all   PHQ-9: Brief Depression Severity Measure Score 0       Health Habits and Functional and Cognitive Screenin/18/2024    10:34 AM   Functional & Cognitive Status   Do you have difficulty preparing food and eating? No   Do you have difficulty bathing yourself, getting dressed or grooming yourself? No   Do you have difficulty using the toilet? No   Do you have difficulty moving around from place to place? No   Do you have trouble with steps or getting out of a bed or a chair? No   Current Diet Well Balanced Diet   Dental Exam Up to date   Eye Exam Up to date   Exercise (times per week) 5 times per week   Current Exercises Include Walking;Pickleball   Do you need help using the phone?  No   Are you deaf or do you have serious difficulty hearing?  No   Do you need help to go to places out of walking distance? No   Do you need help shopping? No   Do you need help preparing meals?  No   Do you need help with housework?  No   Do you need help with laundry? No   Do you need help taking your medications? No   Do you need help managing money? No   Do you ever drive or ride in a car without wearing a seat belt? No   Have you felt unusual stress, anger or loneliness in the last month? No   Who do you live with? Spouse   If you need help, do you have trouble finding someone available to you? No   Have you been bothered in the last four weeks by sexual problems? No   Do you have difficulty concentrating, remembering or making decisions? No       Age-appropriate Screening Schedule:  Refer to the list below for future screening recommendations based on patient's age, sex and/or medical conditions. Orders for these recommended tests are listed in the plan section. The patient has been  provided with a written plan.    Health Maintenance   Topic Date Due    RSV Vaccine - Adults (1 - 1-dose 60+ series) Never done    COVID-19 Vaccine (4 - 2023-24 season) 09/01/2023    LIPID PANEL  05/04/2024    INFLUENZA VACCINE  08/01/2024    ANNUAL WELLNESS VISIT  06/18/2025    TDAP/TD VACCINES (4 - Td or Tdap) 10/18/2026    COLORECTAL CANCER SCREENING  06/11/2029    HEPATITIS C SCREENING  Completed    Pneumococcal Vaccine 65+  Completed    ZOSTER VACCINE  Completed                  CMS Preventative Services Quick Reference  Risk Factors Identified During Encounter:    Fall Risk-High or Moderate: Information on Fall Prevention Shared in After Visit Summary    The above risks/problems have been discussed with the patient.  Pertinent information has been shared with the patient in the After Visit Summary.    Diagnoses and all orders for this visit:    1. Encounter for annual wellness exam in Medicare patient (Primary)    2. IGT (impaired glucose tolerance)  -     Hemoglobin A1c    3. Chronic deep vein thrombosis (DVT) of femoral vein of left lower extremity    4. History of prostate cancer  -     PSA DIAGNOSTIC    5. Medication management  -     CBC & Differential  -     Comprehensive Metabolic Panel  -     Lipid Panel With / Chol / HDL Ratio        Follow Up:   Next Medicare Wellness visit to be scheduled in 1 year.      An After Visit Summary and PPPS were made available to the patient.

## 2024-06-19 ENCOUNTER — TELEPHONE (OUTPATIENT)
Dept: GASTROENTEROLOGY | Facility: CLINIC | Age: 74
End: 2024-06-19
Payer: MEDICARE

## 2024-06-19 LAB
ALBUMIN SERPL-MCNC: 4.1 G/DL (ref 3.8–4.8)
ALP SERPL-CCNC: 62 IU/L (ref 44–121)
ALT SERPL-CCNC: 20 IU/L (ref 0–44)
AST SERPL-CCNC: 24 IU/L (ref 0–40)
BASOPHILS # BLD AUTO: 0 X10E3/UL (ref 0–0.2)
BASOPHILS NFR BLD AUTO: 1 %
BILIRUB SERPL-MCNC: 0.6 MG/DL (ref 0–1.2)
BUN SERPL-MCNC: 16 MG/DL (ref 8–27)
BUN/CREAT SERPL: 16 (ref 10–24)
CALCIUM SERPL-MCNC: 9 MG/DL (ref 8.6–10.2)
CHLORIDE SERPL-SCNC: 106 MMOL/L (ref 96–106)
CHOLEST SERPL-MCNC: 238 MG/DL (ref 100–199)
CHOLEST/HDLC SERPL: 3.1 RATIO (ref 0–5)
CO2 SERPL-SCNC: 24 MMOL/L (ref 20–29)
CREAT SERPL-MCNC: 1.03 MG/DL (ref 0.76–1.27)
EGFRCR SERPLBLD CKD-EPI 2021: 77 ML/MIN/1.73
EOSINOPHIL # BLD AUTO: 0.1 X10E3/UL (ref 0–0.4)
EOSINOPHIL NFR BLD AUTO: 1 %
ERYTHROCYTE [DISTWIDTH] IN BLOOD BY AUTOMATED COUNT: 12.1 % (ref 11.6–15.4)
GLOBULIN SER CALC-MCNC: 2.2 G/DL (ref 1.5–4.5)
GLUCOSE SERPL-MCNC: 91 MG/DL (ref 70–99)
HBA1C MFR BLD: 6.1 % (ref 4.8–5.6)
HCT VFR BLD AUTO: 41.3 % (ref 37.5–51)
HDLC SERPL-MCNC: 77 MG/DL
HGB BLD-MCNC: 13.6 G/DL (ref 13–17.7)
IMM GRANULOCYTES # BLD AUTO: 0 X10E3/UL (ref 0–0.1)
IMM GRANULOCYTES NFR BLD AUTO: 0 %
LDLC SERPL CALC-MCNC: 150 MG/DL (ref 0–99)
LYMPHOCYTES # BLD AUTO: 1.6 X10E3/UL (ref 0.7–3.1)
LYMPHOCYTES NFR BLD AUTO: 37 %
MCH RBC QN AUTO: 30.3 PG (ref 26.6–33)
MCHC RBC AUTO-ENTMCNC: 32.9 G/DL (ref 31.5–35.7)
MCV RBC AUTO: 92 FL (ref 79–97)
MONOCYTES # BLD AUTO: 0.5 X10E3/UL (ref 0.1–0.9)
MONOCYTES NFR BLD AUTO: 12 %
NEUTROPHILS # BLD AUTO: 2.1 X10E3/UL (ref 1.4–7)
NEUTROPHILS NFR BLD AUTO: 49 %
PLATELET # BLD AUTO: 166 X10E3/UL (ref 150–450)
POTASSIUM SERPL-SCNC: 4.5 MMOL/L (ref 3.5–5.2)
PROT SERPL-MCNC: 6.3 G/DL (ref 6–8.5)
PSA SERPL-MCNC: <0.1 NG/ML (ref 0–4)
RBC # BLD AUTO: 4.49 X10E6/UL (ref 4.14–5.8)
SODIUM SERPL-SCNC: 140 MMOL/L (ref 134–144)
TRIGL SERPL-MCNC: 67 MG/DL (ref 0–149)
VLDLC SERPL CALC-MCNC: 11 MG/DL (ref 5–40)
WBC # BLD AUTO: 4.3 X10E3/UL (ref 3.4–10.8)

## 2024-06-19 NOTE — TELEPHONE ENCOUNTER
Pt reviewed results via [employer].     Sent pt [employer] msg advising of results and recommendations. Advised to call if any questions.     Colonoscopy placed in  and recall for 6/11/29.

## 2024-06-19 NOTE — TELEPHONE ENCOUNTER
----- Message from Jamie Nuñez sent at 6/17/2024  2:04 PM EDT -----  Tubular adenoma  Colonoscopy recall 5 years

## 2024-08-29 DIAGNOSIS — N52.8 OTHER MALE ERECTILE DYSFUNCTION: Primary | ICD-10-CM

## 2024-08-29 RX ORDER — SILDENAFIL 100 MG/1
100 TABLET, FILM COATED ORAL DAILY PRN
Qty: 10 TABLET | Refills: 12 | Status: SHIPPED | OUTPATIENT
Start: 2024-08-29

## 2024-10-28 RX ORDER — APIXABAN 2.5 MG/1
TABLET, FILM COATED ORAL
Qty: 180 TABLET | Refills: 1 | Status: SHIPPED | OUTPATIENT
Start: 2024-10-28

## 2024-12-19 ENCOUNTER — OFFICE VISIT (OUTPATIENT)
Dept: INTERNAL MEDICINE | Facility: CLINIC | Age: 74
End: 2024-12-19
Payer: MEDICARE

## 2024-12-19 ENCOUNTER — LAB (OUTPATIENT)
Dept: LAB | Facility: HOSPITAL | Age: 74
End: 2024-12-19
Payer: MEDICARE

## 2024-12-19 VITALS
BODY MASS INDEX: 23.48 KG/M2 | TEMPERATURE: 97.8 F | WEIGHT: 164 LBS | RESPIRATION RATE: 16 BRPM | OXYGEN SATURATION: 98 % | HEIGHT: 70 IN | SYSTOLIC BLOOD PRESSURE: 130 MMHG | DIASTOLIC BLOOD PRESSURE: 74 MMHG | HEART RATE: 69 BPM

## 2024-12-19 DIAGNOSIS — K21.9 GASTROESOPHAGEAL REFLUX DISEASE WITHOUT ESOPHAGITIS: Chronic | ICD-10-CM

## 2024-12-19 DIAGNOSIS — R25.2 MUSCLE CRAMPS: ICD-10-CM

## 2024-12-19 DIAGNOSIS — G25.81 IRON DEFICIENCY ASSOCIATED WITH NONFAMILIAL RESTLESS LEGS SYNDROME: ICD-10-CM

## 2024-12-19 DIAGNOSIS — R73.02 IGT (IMPAIRED GLUCOSE TOLERANCE): Primary | Chronic | ICD-10-CM

## 2024-12-19 DIAGNOSIS — E61.1 IRON DEFICIENCY ASSOCIATED WITH NONFAMILIAL RESTLESS LEGS SYNDROME: ICD-10-CM

## 2024-12-19 LAB
ALBUMIN SERPL-MCNC: 3.8 G/DL (ref 3.5–5.2)
ALBUMIN/GLOB SERPL: 1.3 G/DL
ALP SERPL-CCNC: 65 U/L (ref 39–117)
ALT SERPL W P-5'-P-CCNC: 21 U/L (ref 1–41)
ANION GAP SERPL CALCULATED.3IONS-SCNC: 6.4 MMOL/L (ref 5–15)
AST SERPL-CCNC: 27 U/L (ref 1–40)
BILIRUB SERPL-MCNC: 0.6 MG/DL (ref 0–1.2)
BUN SERPL-MCNC: 16 MG/DL (ref 8–23)
BUN/CREAT SERPL: 15.4 (ref 7–25)
CALCIUM SPEC-SCNC: 8.7 MG/DL (ref 8.6–10.5)
CHLORIDE SERPL-SCNC: 109 MMOL/L (ref 98–107)
CO2 SERPL-SCNC: 24.6 MMOL/L (ref 22–29)
CREAT SERPL-MCNC: 1.04 MG/DL (ref 0.76–1.27)
EGFRCR SERPLBLD CKD-EPI 2021: 75.8 ML/MIN/1.73
FERRITIN SERPL-MCNC: 383 NG/ML (ref 30–400)
GLOBULIN UR ELPH-MCNC: 2.9 GM/DL
GLUCOSE SERPL-MCNC: 87 MG/DL (ref 65–99)
HBA1C MFR BLD: 5.5 % (ref 4.8–5.6)
MAGNESIUM SERPL-MCNC: 2.2 MG/DL (ref 1.6–2.4)
POTASSIUM SERPL-SCNC: 4.1 MMOL/L (ref 3.5–5.2)
PROT SERPL-MCNC: 6.7 G/DL (ref 6–8.5)
SODIUM SERPL-SCNC: 140 MMOL/L (ref 136–145)

## 2024-12-19 PROCEDURE — 80053 COMPREHEN METABOLIC PANEL: CPT | Performed by: INTERNAL MEDICINE

## 2024-12-19 PROCEDURE — G2211 COMPLEX E/M VISIT ADD ON: HCPCS | Performed by: INTERNAL MEDICINE

## 2024-12-19 PROCEDURE — 82728 ASSAY OF FERRITIN: CPT | Performed by: INTERNAL MEDICINE

## 2024-12-19 PROCEDURE — 1126F AMNT PAIN NOTED NONE PRSNT: CPT | Performed by: INTERNAL MEDICINE

## 2024-12-19 PROCEDURE — 83036 HEMOGLOBIN GLYCOSYLATED A1C: CPT | Performed by: INTERNAL MEDICINE

## 2024-12-19 PROCEDURE — 83735 ASSAY OF MAGNESIUM: CPT | Performed by: INTERNAL MEDICINE

## 2024-12-19 PROCEDURE — 36415 COLL VENOUS BLD VENIPUNCTURE: CPT | Performed by: INTERNAL MEDICINE

## 2024-12-19 PROCEDURE — 99214 OFFICE O/P EST MOD 30 MIN: CPT | Performed by: INTERNAL MEDICINE

## 2024-12-19 PROCEDURE — 1159F MED LIST DOCD IN RCRD: CPT | Performed by: INTERNAL MEDICINE

## 2024-12-19 PROCEDURE — 1160F RVW MEDS BY RX/DR IN RCRD: CPT | Performed by: INTERNAL MEDICINE

## 2024-12-19 RX ORDER — TADALAFIL 5 MG/1
5 TABLET ORAL DAILY PRN
Qty: 30 TABLET | Refills: 11 | Status: SHIPPED | OUTPATIENT
Start: 2024-12-19

## 2024-12-19 NOTE — PROGRESS NOTES
Subjective   Dennis De Anda is a 74 y.o. male.     Chief Complaint   Patient presents with    Hyperglycemia         History of Present Illness  In for recheck of chronic DVT with history of PE.  Just a few small peripheral PEs on CT scan.  Extensive DVT left leg which was proximal femoral lead to thrombolysis and embolectomy.  History of PAD.  History of prostate cancer.  No longer being followed by his urologist who retired.  We are now monitoring his prostate cancer.    Hyperglycemia  Symptoms are chronic.   Onset was more than 1 year ago.   Symptoms occur constantly.   Pertinent negative symptoms include no chest pain.   Deep Vein Thrombosis  This is a chronic problem. The current episode started more than 1 month ago. The problem has been gradually improving. Pertinent negatives include no chest pain. The symptoms are aggravated by standing.        The following portions of the patient's history were reviewed and updated as appropriate: allergies, current medications, past social history and problem list.    Outpatient Medications Marked as Taking for the 12/19/24 encounter (Office Visit) with Scott Gonzales MD   Medication Sig Dispense Refill    Eliquis 2.5 MG tablet tablet TAKE 1 TABLET BY MOUTH TWICE DAILY 180 tablet 1    sildenafil (VIAGRA) 100 MG tablet Take 1 tablet by mouth Daily As Needed for Erectile Dysfunction. 10 tablet 12       Review of Systems   Respiratory:  Negative for shortness of breath.    Cardiovascular:  Negative for chest pain, palpitations and leg swelling.       Objective   Vitals:    12/19/24 0901   BP: 130/74   Pulse: 69   Resp: 16   Temp: 97.8 °F (36.6 °C)   SpO2: 98%            12/19/24  0901   Weight: 74.4 kg (164 lb)      [unfilled]  Body mass index is 23.53 kg/m².      Physical Exam   Constitutional: He appears well-developed.   Cardiovascular: Normal rate, regular rhythm and normal heart sounds. Exam reveals no gallop.   No murmur heard.  Pulmonary/Chest: Effort normal  and breath sounds normal. No respiratory distress. He has no wheezes. He has no rales.   Abdominal: Normal appearance.   Neurological: He is alert.         Problems Addressed this Visit          Endocrine and Metabolic    IGT (impaired glucose tolerance) - Primary (Chronic)       Gastrointestinal Abdominal     Gastroesophageal reflux disease without esophagitis (Chronic)     Diagnoses         Codes Comments    IGT (impaired glucose tolerance)    -  Primary ICD-10-CM: R73.02  ICD-9-CM: 790.22     Gastroesophageal reflux disease without esophagitis     ICD-10-CM: K21.9  ICD-9-CM: 530.81           Assessment & Plan   In for recheck of DVT/PE today December 2024.  Neck pain is doing okay as long as he does his physical therapy and exercises.  Symptoms of not cleared 100% however.  He is having continued issues with ED.  He tried Cialis without benefit and also had prescription on Cialis and that has been better.  He had small peripheral PE but extensive DVT left leg.  Had thrombolysis and embolectomy.  The DVT occurred following plane ride to [place] followed 3 weeks later by a road trip/car ride to [facility].  He has completed 24 months of eliquis treatment but will plan to continue lifelong low dose eliquis at this point given the severity of his original DVT.  He's using a support hose with travel only at this point.  His peripheral edema is virtually resolved at this point.  We will recheck in 6 months.  Annual lab work June 2024 included CBC, CMP, lipids, A1c, PSA, UA.  Remains on Eliquis 2.5 mg twice daily and sildenafil 40 mg daily as needed and those are reviewed today.   He thinks Cialis worked better than sildenafil and will go back to Cialis 20 mg daily as needed.  However first he would like to try daily Cialis so we will try 5 mg daily and see how he responds.  Annual wellness visit is due June 2024.  We will recheck glucose and A1c every 6 months.  He has been complaining of leg cramps and  occasionally hand and arm cramps as well.  Symptoms for over 1 year.  Will add a CMP, ferritin and magnesium to his lab work today.  He has tried a foam with some magnesium supplement which is a cream and that seems to help.  Both Chvostek's and Trousseau's test are negative today.  We also discussed additional modalities for ED to include penile implants but he is well versed on off the various options.  He is fasting today.    The above information was reviewed again today 12/19/24.  It continues to be accurate as reflected above and is unchanged.  History, physical and review of systems all reviewed and are unchanged.  Medications were reviewed today and continue the current dosing.         Dragon disclaimer:   Much of this encounter note is an electronic transcription/translation of spoken language to printed text. The electronic translation of spoken language may permit erroneous, or at times, nonsensical words or phrases to be inadvertently transcribed; Although I have reviewed the note for such errors, some may still exist.

## 2025-02-20 ENCOUNTER — TELEPHONE (OUTPATIENT)
Dept: INTERNAL MEDICINE | Facility: CLINIC | Age: 75
End: 2025-02-20
Payer: MEDICARE

## 2025-05-01 ENCOUNTER — TELEPHONE (OUTPATIENT)
Dept: INTERNAL MEDICINE | Facility: CLINIC | Age: 75
End: 2025-05-01
Payer: MEDICARE

## 2025-05-01 DIAGNOSIS — R73.02 IGT (IMPAIRED GLUCOSE TOLERANCE): Primary | ICD-10-CM

## 2025-05-01 NOTE — TELEPHONE ENCOUNTER
Patient has surgical clearance scheduled 5/22/25 and is wanting a personal call to discuss concerns.

## 2025-05-02 ENCOUNTER — LAB (OUTPATIENT)
Dept: LAB | Facility: HOSPITAL | Age: 75
End: 2025-05-02
Payer: MEDICARE

## 2025-05-02 LAB
GLUCOSE SERPL-MCNC: 105 MG/DL (ref 65–99)
HBA1C MFR BLD: 5.7 % (ref 4.8–5.6)

## 2025-05-02 PROCEDURE — 82947 ASSAY GLUCOSE BLOOD QUANT: CPT | Performed by: INTERNAL MEDICINE

## 2025-05-02 PROCEDURE — 83036 HEMOGLOBIN GLYCOSYLATED A1C: CPT | Performed by: INTERNAL MEDICINE

## 2025-05-02 PROCEDURE — 36415 COLL VENOUS BLD VENIPUNCTURE: CPT | Performed by: INTERNAL MEDICINE

## 2025-05-02 RX ORDER — APIXABAN 2.5 MG/1
2.5 TABLET, FILM COATED ORAL EVERY 12 HOURS SCHEDULED
Qty: 180 TABLET | Refills: 1 | Status: SHIPPED | OUTPATIENT
Start: 2025-05-02

## 2025-05-22 ENCOUNTER — OFFICE VISIT (OUTPATIENT)
Dept: INTERNAL MEDICINE | Facility: CLINIC | Age: 75
End: 2025-05-22
Payer: MEDICARE

## 2025-05-22 VITALS
TEMPERATURE: 97.3 F | BODY MASS INDEX: 24.48 KG/M2 | RESPIRATION RATE: 16 BRPM | HEIGHT: 70 IN | HEART RATE: 70 BPM | OXYGEN SATURATION: 98 % | WEIGHT: 171 LBS | SYSTOLIC BLOOD PRESSURE: 122 MMHG | DIASTOLIC BLOOD PRESSURE: 64 MMHG

## 2025-05-22 DIAGNOSIS — Z01.818 PREOPERATIVE EVALUATION TO RULE OUT SURGICAL CONTRAINDICATION: ICD-10-CM

## 2025-05-22 DIAGNOSIS — M17.11 PRIMARY OSTEOARTHRITIS OF RIGHT KNEE: Primary | ICD-10-CM

## 2025-05-22 NOTE — PROGRESS NOTES
Subjective   Dennis De Anda is a 75 y.o. male.     Chief Complaint   Patient presents with    Pre-op Exam     R leg total Knee          History of Present Illness  In for preop check for upcoming right TKR.  That will be June 18, 2025.  He has had no history of heart disease.  No MI.  No angina.  No CHF.  No stroke.  No COPD.  No diabetes mellitus.  He is pretty active and does well with activity.       The following portions of the patient's history were reviewed and updated as appropriate: allergies, current medications, past social history and problem list.    Outpatient Medications Marked as Taking for the 5/22/25 encounter (Office Visit) with Scott Gonzales MD   Medication Sig Dispense Refill    Eliquis 2.5 MG tablet tablet TAKE 1 TABLET BY MOUTH TWICE DAILY 180 tablet 1    tadalafil (Cialis) 5 MG tablet Take 1 tablet by mouth Daily As Needed for Erectile Dysfunction. 30 tablet 11       Review of Systems   Constitutional:  Negative for chills and fever.   Respiratory:  Negative for shortness of breath and wheezing.    Cardiovascular:  Positive for leg swelling (Recent). Negative for chest pain and palpitations.       Objective   Vitals:    05/22/25 1343   BP: 122/64   Pulse: 70   Resp: 16   Temp: 97.3 °F (36.3 °C)   SpO2: 98%      Wt Readings from Last 3 Encounters:   05/22/25 77.6 kg (171 lb)   12/19/24 74.4 kg (164 lb)   06/18/24 79.4 kg (175 lb)    Body mass index is 24.54 kg/m².      Physical Exam  Constitutional:       Appearance: Normal appearance. He is well-developed.   Neck:      Thyroid: No thyromegaly.   Cardiovascular:      Rate and Rhythm: Normal rate and regular rhythm.      Heart sounds: Normal heart sounds. No murmur heard.     No gallop.   Pulmonary:      Effort: Pulmonary effort is normal. No respiratory distress.      Breath sounds: Normal breath sounds. No wheezing or rales.   Abdominal:      General: Bowel sounds are normal.      Palpations: Abdomen is soft. There is no mass.       Tenderness: There is no abdominal tenderness. There is no guarding.   Musculoskeletal:         General: Swelling (Left ankle 1+) present.   Neurological:      Mental Status: He is alert.           Problems Addressed this Visit          Musculoskeletal and Injuries    DJD (degenerative joint disease) of knee - Primary    Relevant Orders    Ambulatory Referral to Cardiology for Preop Clearance     Other Visit Diagnoses         Preoperative evaluation to rule out surgical contraindication        Relevant Orders    Ambulatory Referral to Cardiology for Preop Clearance          Diagnoses         Codes Comments      Primary osteoarthritis of right knee    -  Primary ICD-10-CM: M17.11  ICD-9-CM: 715.16       Preoperative evaluation to rule out surgical contraindication     ICD-10-CM: Z01.818  ICD-9-CM: V72.83           Assessment & Plan   In for preop evaluation for upcoming TKR right knee.  Overall health has been very good.  He is got mitral valve prolapse.  History of PVCs.  The big issue is his left leg DVT in the distant past.  Remains on Eliquis 2.5 mg twice daily.  I think he can come off of that okay for surgery but he will resume it postop.  He will stop it 3 days postop.  He will resume Eliquis postop when okay with his orthopedist.     I reviewed his lab work today and it looks fine other than a blood sugar 137 which To be a random blood sugar.  A later fasting blood sugar was over 105 with a A1c of 5.7.  This would suggest an element of impaired glucose tolerance.  I also reviewed his EKG today and he has T wave flattening in his anterior leads which is new from 2016.  Given the EKG changes we will plan some additional cardiac evaluation preoperatively including a TTE and possibly a Viviane stress test.  Will go ahead and refer to cardiology to help with that evaluation regarding cardiac clearance.  Otherwise he is very fit and a good candidate for surgery.    The above information was reviewed again today  05/22/25.  It continues to be accurate as reflected above and is unchanged.  History, physical and review of systems all reviewed and are unchanged.  Medications were reviewed today and continue the current dosing.               Dragon disclaimer:   Part of this note may be an electronic transcription/translation of spoken language to printed text using the Dragon Dictation System.

## 2025-05-27 ENCOUNTER — TELEPHONE (OUTPATIENT)
Dept: CARDIOLOGY | Age: 75
End: 2025-05-27

## 2025-05-27 NOTE — TELEPHONE ENCOUNTER
"Caller: Dennis De Anda \"Dr. De Anda\"    Relationship to patient: Self    Best call back number: [phone]    Patient is needing: PT CALLED IN REQUESTING A STATUS UPDATE ON REFERRAL. STATES HE NEEDS SURGERY CLEARANCE SOON FOR SURGERY ON 06.18.2025. PLEASE CALL TO SCHEDULE.       "

## 2025-06-04 ENCOUNTER — OFFICE VISIT (OUTPATIENT)
Dept: CARDIOLOGY | Age: 75
End: 2025-06-04
Payer: MEDICARE

## 2025-06-04 VITALS
WEIGHT: 168 LBS | DIASTOLIC BLOOD PRESSURE: 68 MMHG | HEIGHT: 70 IN | SYSTOLIC BLOOD PRESSURE: 120 MMHG | BODY MASS INDEX: 24.05 KG/M2 | HEART RATE: 52 BPM | OXYGEN SATURATION: 97 %

## 2025-06-04 DIAGNOSIS — G47.33 OSA (OBSTRUCTIVE SLEEP APNEA): Chronic | ICD-10-CM

## 2025-06-04 DIAGNOSIS — I34.1 MVP (MITRAL VALVE PROLAPSE): Primary | Chronic | ICD-10-CM

## 2025-06-04 DIAGNOSIS — Z01.810 PREOPERATIVE CARDIOVASCULAR EXAMINATION: ICD-10-CM

## 2025-06-04 DIAGNOSIS — I49.3 PVC (PREMATURE VENTRICULAR CONTRACTION): ICD-10-CM

## 2025-06-04 DIAGNOSIS — M17.11 PRIMARY OSTEOARTHRITIS OF RIGHT KNEE: ICD-10-CM

## 2025-06-04 DIAGNOSIS — I82.512 CHRONIC DEEP VEIN THROMBOSIS (DVT) OF FEMORAL VEIN OF LEFT LOWER EXTREMITY: ICD-10-CM

## 2025-06-04 DIAGNOSIS — K21.9 GASTROESOPHAGEAL REFLUX DISEASE WITHOUT ESOPHAGITIS: Chronic | ICD-10-CM

## 2025-06-04 NOTE — PROGRESS NOTES
CARDIOLOGY    Ruben Johnson MD    ENCOUNTER DATE:  06/04/25      Dennis De Anda / 75 y.o. / male        CHIEF COMPLAINT / REASON FOR OFFICE VISIT     Establish Care (Patient is in the office today to establish care and obtain cardiac clearance. )      HISTORY OF PRESENT ILLNESS       HPI    Dennis De Anda is a 75 y.o. male with PMH of DVT, mitral valve prolapse, hyperlipidemia, prediabetes, DJD, TRIXIE who presents to establish care is a new patient.    Unaccompanied at the visit. Today, patient has no acute complaints. Being planned for right knee replacement by Dr. Reese Glover. Retired dentist. Stays quite active with walking 2-3 miles per day and lots of household work and maintenance. Climbs stairs all the time without difficulty apart from knee pain. Pt had a massive DVT and PE in 2017 without trigger and has been on Eliquis since. Occasional left leg swelling where his DVT occurred. Pt wears a dental device for TRIXIE but not currently using CPAP as he did not tolerate it after using it for 3 years. Denies chest pain, dyspnea on exertion, palpitation, orthopnea, PND.  Occasional dizziness with brisk walking, denies syncope or bleeding. He did fall 1 month ago while trying to power wash his home and slipped. Does not recall having had a heart attack or stroke. Never smoker. Consumes 2 glasses of wine per evening, denies substance.    REVIEW OF SYSTEMS     Review of Systems   Constitutional: Negative for weight loss.   Cardiovascular:  Positive for leg swelling. Negative for chest pain, dyspnea on exertion, orthopnea, palpitations, paroxysmal nocturnal dyspnea and syncope.   Respiratory:  Negative for shortness of breath.    Hematologic/Lymphatic: Negative for bleeding problem.   Musculoskeletal:  Positive for arthritis and joint pain. Negative for falls.   Gastrointestinal:  Negative for hematochezia and melena.   Genitourinary:  Negative for hematuria.   Neurological:  Positive for dizziness and  "light-headedness.   Psychiatric/Behavioral:  Negative for altered mental status.            MEDICATIONS      Outpatient Encounter Medications as of 6/4/2025   Medication Sig Dispense Refill   • Eliquis 2.5 MG tablet tablet TAKE 1 TABLET BY MOUTH TWICE DAILY 180 tablet 1   • tadalafil (Cialis) 5 MG tablet Take 1 tablet by mouth Daily As Needed for Erectile Dysfunction. 30 tablet 11     No facility-administered encounter medications on file as of 6/4/2025.         VITAL SIGNS     Visit Vitals  /68 (BP Location: Left arm, Patient Position: Sitting, Cuff Size: Adult)   Pulse 52   Ht 177.8 cm (70\")   Wt 76.2 kg (168 lb)   SpO2 97%   BMI 24.11 kg/m²         Wt Readings from Last 3 Encounters:   06/04/25 76.2 kg (168 lb)   05/22/25 77.6 kg (171 lb)   12/19/24 74.4 kg (164 lb)     Body mass index is 24.11 kg/m².      PHYSICAL EXAMINATION     Vitals and nursing note reviewed.   Constitutional:       Appearance: Healthy appearance. Not in distress.   Neck:      Vascular: No JVR.   Pulmonary:      Effort: Pulmonary effort is normal.      Breath sounds: Normal breath sounds. No wheezing. No rhonchi. No rales.   Cardiovascular:      Normal rate. Regular rhythm.      Murmurs: There is no murmur.   Edema:     Peripheral edema absent.   Abdominal:      General: There is no distension.      Palpations: Abdomen is soft.      Tenderness: There is no abdominal tenderness.   Musculoskeletal:      Cervical back: Neck supple. Skin:     General: Skin is cool.   Neurological:      Mental Status: Alert and oriented to person, place and time.         REVIEWED DATA       ECG 12 Lead    Date/Time: 6/4/2025 8:48 AM  Performed by: Ruben Johnson MD    Authorized by: Ruben Johnson MD  Previous ECG: no previous ECG available  Rhythm: sinus rhythm    Clinical impression: normal ECG        Lab Results   Component Value Date    WBC 4.3 06/18/2024    HGB 13.6 06/18/2024    HCT 41.3 06/18/2024    MCV 92 06/18/2024     06/18/2024       Lab " Results   Component Value Date    HGBA1C 5.70 (H) 05/02/2025       Lab Results   Component Value Date    GLUCOSE 105 (H) 05/02/2025    BUN 16 12/19/2024    CREATININE 1.04 12/19/2024    EGFR 75.8 12/19/2024    BCR 15.4 12/19/2024     12/19/2024    K 4.1 12/19/2024    CO2 24.6 12/19/2024    CALCIUM 8.7 12/19/2024    ALBUMIN 3.8 12/19/2024    BILITOT 0.6 12/19/2024    AST 27 12/19/2024    ALT 21 12/19/2024       Lab Results   Component Value Date    CHLPL 238 (H) 06/18/2024    TRIG 67 06/18/2024    HDL 77 06/18/2024     (H) 06/18/2024       No results found for this or any previous visit.        ASSESSMENT & PLAN     Diagnoses and all orders for this visit:    1. MVP (mitral valve prolapse) (Primary)    2. PVC (premature ventricular contraction)    3. Chronic deep vein thrombosis (DVT) of femoral vein of left lower extremity    4. Gastroesophageal reflux disease without esophagitis    5. Primary osteoarthritis of right knee    6. TRIXIE (obstructive sleep apnea)  Overview:  Mild. Uses dental appliance         Preoperative evaluation: Patient is being planned for right knee surgery later this month with Dr. Glover.  He is quite physically active and able to achieve 4-10 METs without angina. Last echo was a decade ago in 2015 that reported LVEF 66%, trace to mild MR, biatrial enlargement, mildly elevated RVSP.  ECG in office today showed NSR without ischemic changes.  RCRI score 0, Mcdonald score predicts 0.2% risk of MI or cardiac arrest intraoperatively or up to 30 days postop, we will further evaluate with repeat echo to reassess biventricular function and RVSP, if that is reassuring, patient is okay to proceed to planned knee surgery without further cardiac testing.  Mitral valve prolapse/pulmonary hypertension: Repeat echo as above.  History of DVT/PE: This occurred in 2017 without obvious provoking factor.  Continue Eliquis 2.5 bid which he has tolerated very well for years.  Hyperlipidemia/ASCVD risk:  Lipids last year 6/2024 showed HDL 77, , not controlled.  CT calcium score back in 8/2020 was 0.  10-year ASCVD risk 19%.  Patient is very hesitant to empirically start statin due to fear of side effects but is agreeable to repeat CT calcium score (not urgent, can be done after surgery if needed; patient prefers to be done at [facility] for reduced co-pay).   Prediabetes: Hemoglobin A1c last month 5/2025 was 5.8.  Focus on lifestyle modification.  DJD: Knee surgery as above  TRIXIE: Did not tolerate CPAP after 3 years of use, currently has a dental appliance at night.    I spent 33 minutes caring for Dennis on this date of service. This time includes time spent by me in the following activities: preparing for the visit, reviewing tests, performing a medically appropriate examination and/or evaluation, counseling and educating the patient/family/caregiver, and referring and communicating with other health care professionals.     Additionally, I am providing longitudinal care which includes continuously being a focal point for all of the patient's health care services and ongoing medical care related to hyperlipidemia and preoperative/ASCVD risk assessment as documented above.    Ruben Johnson MD. PhD. [facility]  06/04/25  08:32 EDT    Part of this note may be an electronic transcription/translation of spoken language to printed text using the Dragon dictation system.

## 2025-06-05 ENCOUNTER — TELEPHONE (OUTPATIENT)
Dept: CARDIOLOGY | Age: 75
End: 2025-06-05
Payer: MEDICARE

## 2025-06-05 NOTE — TELEPHONE ENCOUNTER
Cardiac clearance paperwork placed in your inbasket tray from [facility]. Patient is scheduled for right total knee replacement on 6/18/25

## 2025-06-06 ENCOUNTER — HOSPITAL ENCOUNTER (OUTPATIENT)
Dept: CARDIOLOGY | Facility: HOSPITAL | Age: 75
Discharge: HOME | End: 2025-06-06
Payer: MEDICARE

## 2025-06-06 VITALS
HEART RATE: 54 BPM | BODY MASS INDEX: 24.05 KG/M2 | DIASTOLIC BLOOD PRESSURE: 72 MMHG | HEIGHT: 70 IN | SYSTOLIC BLOOD PRESSURE: 120 MMHG | WEIGHT: 168 LBS

## 2025-06-06 DIAGNOSIS — I34.1 MVP (MITRAL VALVE PROLAPSE): Chronic | ICD-10-CM

## 2025-06-06 DIAGNOSIS — I49.3 PVC (PREMATURE VENTRICULAR CONTRACTION): ICD-10-CM

## 2025-06-06 DIAGNOSIS — K21.9 GASTROESOPHAGEAL REFLUX DISEASE WITHOUT ESOPHAGITIS: Chronic | ICD-10-CM

## 2025-06-06 DIAGNOSIS — I82.512 CHRONIC DEEP VEIN THROMBOSIS (DVT) OF FEMORAL VEIN OF LEFT LOWER EXTREMITY: ICD-10-CM

## 2025-06-06 DIAGNOSIS — M17.11 PRIMARY OSTEOARTHRITIS OF RIGHT KNEE: ICD-10-CM

## 2025-06-06 LAB
AORTIC ARCH: 2.8 CM
AORTIC DIMENSIONLESS INDEX: 0.63 (DI)
ASCENDING AORTA: 3.3 CM
AV MEAN PRESS GRAD SYS DOP V1V2: 2 MMHG
AV VMAX SYS DOP: 101 CM/SEC
BH CV ECHO MEAS - ACS: 2.27 CM
BH CV ECHO MEAS - AO MAX PG: 4.1 MMHG
BH CV ECHO MEAS - AO ROOT DIAM: 3.6 CM
BH CV ECHO MEAS - AO V2 VTI: 25.6 CM
BH CV ECHO MEAS - AVA(I,D): 2.05 CM2
BH CV ECHO MEAS - EDV(CUBED): 97.3 ML
BH CV ECHO MEAS - EDV(MOD-SP2): 103 ML
BH CV ECHO MEAS - EDV(MOD-SP4): 96 ML
BH CV ECHO MEAS - EF(MOD-SP2): 61.2 %
BH CV ECHO MEAS - EF(MOD-SP4): 64.6 %
BH CV ECHO MEAS - ESV(CUBED): 23 ML
BH CV ECHO MEAS - ESV(MOD-SP2): 40 ML
BH CV ECHO MEAS - ESV(MOD-SP4): 34 ML
BH CV ECHO MEAS - FS: 38.2 %
BH CV ECHO MEAS - IVS/LVPW: 1.11 CM
BH CV ECHO MEAS - IVSD: 1 CM
BH CV ECHO MEAS - LAT PEAK E' VEL: 11.6 CM/SEC
BH CV ECHO MEAS - LV DIASTOLIC VOL/BSA (35-75): 49.5 CM2
BH CV ECHO MEAS - LV MASS(C)D: 148.1 GRAMS
BH CV ECHO MEAS - LV MAX PG: 1.58 MMHG
BH CV ECHO MEAS - LV MEAN PG: 1 MMHG
BH CV ECHO MEAS - LV SYSTOLIC VOL/BSA (12-30): 17.5 CM2
BH CV ECHO MEAS - LV V1 MAX: 62.9 CM/SEC
BH CV ECHO MEAS - LV V1 VTI: 16.1 CM
BH CV ECHO MEAS - LVIDD: 4.6 CM
BH CV ECHO MEAS - LVIDS: 2.8 CM
BH CV ECHO MEAS - LVOT AREA: 3.3 CM2
BH CV ECHO MEAS - LVOT DIAM: 2.04 CM
BH CV ECHO MEAS - LVPWD: 0.9 CM
BH CV ECHO MEAS - MED PEAK E' VEL: 8.5 CM/SEC
BH CV ECHO MEAS - MR MAX PG: 45.3 MMHG
BH CV ECHO MEAS - MR MAX VEL: 336.5 CM/SEC
BH CV ECHO MEAS - MV A DUR: 0.11 SEC
BH CV ECHO MEAS - MV A MAX VEL: 38.3 CM/SEC
BH CV ECHO MEAS - MV DEC SLOPE: 138.8 CM/SEC2
BH CV ECHO MEAS - MV DEC TIME: 0.28 SEC
BH CV ECHO MEAS - MV E MAX VEL: 52.9 CM/SEC
BH CV ECHO MEAS - MV E/A: 1.38
BH CV ECHO MEAS - MV MAX PG: 1.43 MMHG
BH CV ECHO MEAS - MV MEAN PG: 0.7 MMHG
BH CV ECHO MEAS - MV P1/2T: 141.4 MSEC
BH CV ECHO MEAS - MV V2 VTI: 25.4 CM
BH CV ECHO MEAS - MVA(P1/2T): 1.56 CM2
BH CV ECHO MEAS - MVA(VTI): 2.06 CM2
BH CV ECHO MEAS - PA ACC TIME: 0.09 SEC
BH CV ECHO MEAS - PA V2 MAX: 68.4 CM/SEC
BH CV ECHO MEAS - PULM A REVS DUR: 0.11 SEC
BH CV ECHO MEAS - PULM A REVS VEL: 31.1 CM/SEC
BH CV ECHO MEAS - PULM DIAS VEL: 39.5 CM/SEC
BH CV ECHO MEAS - PULM S/D: 1.45
BH CV ECHO MEAS - PULM SYS VEL: 57.2 CM/SEC
BH CV ECHO MEAS - QP/QS: 0.61
BH CV ECHO MEAS - RAP SYSTOLE: 3 MMHG
BH CV ECHO MEAS - RV MAX PG: 0.89 MMHG
BH CV ECHO MEAS - RV V1 MAX: 47.2 CM/SEC
BH CV ECHO MEAS - RV V1 VTI: 11.3 CM
BH CV ECHO MEAS - RVOT DIAM: 1.9 CM
BH CV ECHO MEAS - RVSP: 31.1 MMHG
BH CV ECHO MEAS - SUP REN AO DIAM: 1.9 CM
BH CV ECHO MEAS - SV(LVOT): 52.4 ML
BH CV ECHO MEAS - SV(MOD-SP2): 63 ML
BH CV ECHO MEAS - SV(MOD-SP4): 62 ML
BH CV ECHO MEAS - SV(RVOT): 31.9 ML
BH CV ECHO MEAS - SVI(LVOT): 27.1 ML/M2
BH CV ECHO MEAS - SVI(MOD-SP2): 32.5 ML/M2
BH CV ECHO MEAS - SVI(MOD-SP4): 32 ML/M2
BH CV ECHO MEAS - TAPSE (>1.6): 2.24 CM
BH CV ECHO MEAS - TR MAX PG: 28.1 MMHG
BH CV ECHO MEAS - TR MAX VEL: 265.2 CM/SEC
BH CV ECHO MEASUREMENTS AVERAGE E/E' RATIO: 5.26
BH CV ECHO SHUNT ASSESSMENT PERFORMED (HIDDEN SCRIPTING): 1
BH CV XLRA - RV BASE: 3.6 CM
BH CV XLRA - RV LENGTH: 8.8 CM
BH CV XLRA - RV MID: 2.6 CM
BH CV XLRA - TDI S': 14.5 CM/SEC
LEFT ATRIUM VOLUME INDEX: 34 ML/M2
LV EF BIPLANE MOD: 65.5 %
SINUS: 3.4 CM
STJ: 2.7 CM

## 2025-06-06 PROCEDURE — 93306 TTE W/DOPPLER COMPLETE: CPT

## 2025-06-09 DIAGNOSIS — R00.2 PALPITATIONS: Primary | ICD-10-CM

## 2025-06-09 NOTE — TELEPHONE ENCOUNTER
Clearance has been faxed over successfully.   Pt is aware hold Plavix 3 days prior to procedure. No further questions or concerns.

## 2025-06-09 NOTE — TELEPHONE ENCOUNTER
Clearance form filled out and being faxed out by my ALAYNA Soriano (cc'ed here) to Dr. Glover office.

## 2025-06-25 ENCOUNTER — OFFICE VISIT (OUTPATIENT)
Dept: INTERNAL MEDICINE | Facility: CLINIC | Age: 75
End: 2025-06-25
Payer: MEDICARE

## 2025-06-25 VITALS
DIASTOLIC BLOOD PRESSURE: 80 MMHG | HEIGHT: 70 IN | OXYGEN SATURATION: 100 % | HEART RATE: 67 BPM | BODY MASS INDEX: 23.62 KG/M2 | TEMPERATURE: 98.3 F | RESPIRATION RATE: 18 BRPM | SYSTOLIC BLOOD PRESSURE: 118 MMHG | WEIGHT: 165 LBS

## 2025-06-25 DIAGNOSIS — Z00.00 ENCOUNTER FOR ANNUAL WELLNESS EXAM IN MEDICARE PATIENT: ICD-10-CM

## 2025-06-25 DIAGNOSIS — M17.0 PRIMARY OSTEOARTHRITIS OF BOTH KNEES: Chronic | ICD-10-CM

## 2025-06-25 DIAGNOSIS — R73.02 IGT (IMPAIRED GLUCOSE TOLERANCE): Primary | Chronic | ICD-10-CM

## 2025-06-25 DIAGNOSIS — K21.9 GASTROESOPHAGEAL REFLUX DISEASE WITHOUT ESOPHAGITIS: Chronic | ICD-10-CM

## 2025-06-25 NOTE — PROGRESS NOTES
Subjective   Dennis De Anda is a 75 y.o. male.     Chief Complaint   Patient presents with    Medicare Wellness-subsequent    Hyperglycemia    Heartburn    Sleep Apnea         History of Present Illness  In for recheck of chronic DVT with history of PE.  Just a few small peripheral PEs on CT scan.  Extensive DVT left leg which was proximal femoral lead to thrombolysis and embolectomy.  History of PAD.  History of prostate cancer.  No longer being followed by his urologist who retired.  We are now monitoring his prostate cancer.    Hyperglycemia  Chronicity:  Chronic  Onset:  More than 1 year ago  Frequency:  Constantly  Symptoms: no abdominal pain and no nausea    Sleep Apnea  Symptoms: no abdominal pain and no nausea         The following portions of the patient's history were reviewed and updated as appropriate: allergies, current medications, past social history and problem list.    Outpatient Medications Marked as Taking for the 6/25/25 encounter (Office Visit) with Scott Gonzales MD   Medication Sig Dispense Refill    Eliquis 2.5 MG tablet tablet TAKE 1 TABLET BY MOUTH TWICE DAILY 180 tablet 1    tadalafil (Cialis) 5 MG tablet Take 1 tablet by mouth Daily As Needed for Erectile Dysfunction. 30 tablet 11       Review of Systems   Respiratory:  Negative for shortness of breath.    Cardiovascular:  Negative for palpitations and leg swelling.   Gastrointestinal:  Positive for constipation. Negative for abdominal pain and nausea.       Objective   Vitals:    06/25/25 0858   BP: 118/80   Pulse: 67   Resp: 18   Temp: 98.3 °F (36.8 °C)   SpO2: 100%            06/25/25  0858   Weight: 74.8 kg (165 lb)      [unfilled]  Body mass index is 23.68 kg/m².    Physical Exam  Constitutional:       Appearance: Normal appearance. He is well-developed.   Neck:      Thyroid: No thyromegaly.   Cardiovascular:      Rate and Rhythm: Normal rate and regular rhythm.      Heart sounds: Normal heart sounds. No murmur heard.     No  gallop.   Pulmonary:      Effort: Pulmonary effort is normal. No respiratory distress.      Breath sounds: Normal breath sounds. No wheezing or rales.   Abdominal:      General: Bowel sounds are normal.      Palpations: Abdomen is soft. There is no mass.      Tenderness: There is no abdominal tenderness. There is no guarding.   Neurological:      Mental Status: He is alert.            Problems Addressed this Visit          Endocrine and Metabolic    IGT (impaired glucose tolerance) - Primary (Chronic)                     Gastrointestinal Abdominal     Gastroesophageal reflux disease without esophagitis (Chronic)                     Musculoskeletal and Injuries    OA (osteoarthritis) (Chronic)                   Other Visit Diagnoses         Encounter for annual wellness exam in Medicare patient              Diagnoses         Codes Comments      IGT (impaired glucose tolerance)    -  Primary ICD-10-CM: R73.02  ICD-9-CM: 790.22       Gastroesophageal reflux disease without esophagitis     ICD-10-CM: K21.9  ICD-9-CM: 530.81       Primary osteoarthritis of both knees     ICD-10-CM: M17.0  ICD-9-CM: 715.16       Encounter for annual wellness exam in Medicare patient     ICD-10-CM: Z00.00  ICD-9-CM: V70.0           Assessment & Plan   In for recheck of DVT/PE today June 2025.  Also annual wellness visit today June 2025.  He has 1 week postop right TKR.  Neck pain is doing okay as long as he does his physical therapy and exercises.  He is having continued issues with ED but has responded reasonably well to Cialis.  He had small peripheral PE but extensive DVT left leg.  Had thrombolysis and embolectomy.  The DVT occurred following plane ride to [place] followed 3 weeks later by a road trip/car ride to [facility].  He has completed 24 months of eliquis treatment but will plan to continue lifelong low dose eliquis at this point given the severity of his original DVT.  He's using a support hose with travel only at this  point.  His peripheral edema is virtually resolved at this point.  We will recheck in 6 months.  Annual lab work June 2024 included CBC, CMP, lipids, A1c, PSA, UA.  He had CBC, BMP, A1c done preoperatively 1 month ago.  He did have a CMP in December 2024.  Remains on Eliquis 2.5 mg twice daily and Cialis 5 mg daily as needed and those are reviewed today.   We will recheck glucose and A1c every 6 months.  He is fasting today.    The above information was reviewed again today 06/25/25.  It continues to be accurate as reflected above and is unchanged.  History, physical and review of systems all reviewed and are unchanged.  Medications were reviewed today and continue the current dosing.           Rudolph disclaimer:   Much of this encounter note is an electronic transcription/translation of spoken language to printed text. The electronic translation of spoken language may permit erroneous, or at times, nonsensical words or phrases to be inadvertently transcribed; Although I have reviewed the note for such errors, some may still exist.

## 2025-06-25 NOTE — PROGRESS NOTES
Subjective   The ABCs of the Annual Wellness Visit  Medicare Wellness Visit      Dennis De Anda is a 75 y.o. patient who presents for a Medicare Wellness Visit.    The following portions of the patient's history were reviewed and   updated as appropriate: allergies, current medications, past family history, past medical history, past social history, past surgical history, and problem list.    Compared to one year ago, the patient's physical   health is the same.  Compared to one year ago, the patient's mental   health is the same.    Recent Hospitalizations:  He was not admitted to the hospital during the last year.     Current Medical Providers:  Patient Care Team:  Scott Gonzales MD as PCP - General (Internal Medicine)  Scott Gonzales MD as PCP - Internal Medicine  Justin Acosta MD as Surgeon (Vascular Surgery)  Jamie Nuñez MD as Consulting Physician (Gastroenterology)  Reese Glover MD as Consulting Physician (Orthopedic Surgery)  Nikhil Mera MD as Consulting Physician (Otolaryngology)  Ronaldo Lowry OD (Optometry)  Cosme Herrera MD (Dermatology)  Ramiro Cassidy MD as Consulting Physician (Cardiology)    Outpatient Medications Prior to Visit   Medication Sig Dispense Refill    Eliquis 2.5 MG tablet tablet TAKE 1 TABLET BY MOUTH TWICE DAILY 180 tablet 1    tadalafil (Cialis) 5 MG tablet Take 1 tablet by mouth Daily As Needed for Erectile Dysfunction. 30 tablet 11     No facility-administered medications prior to visit.     No opioid medication identified on active medication list. I have reviewed chart for other potential  high risk medication/s and harmful drug interactions in the elderly.      Aspirin is not on active medication list.  Aspirin use is not indicated based on review of current medical condition/s. Risk of harm outweighs potential benefits.  .    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  ACP discussion was held with the patient during this visit. Patient has an advance directive (not in EMR), copy requested.            Objective   Vitals:    06/25/25 0858   BP: 118/80   Pulse: 67   Resp: 18   Temp: 98.3 °F (36.8 °C)   TempSrc: Temporal   SpO2: 100%   Weight: 74.8 kg (165 lb)   Height: 177.8 cm (70\")   PainSc: 4        Estimated body mass index is 23.68 kg/m² as calculated from the following:    Height as of this encounter: 177.8 cm (70\").    Weight as of this encounter: 74.8 kg (165 lb).    BMI is within normal parameters. No other follow-up for BMI required.           Does the patient have evidence of cognitive impairment? No  Lab Results   Component Value Date    HGBA1C 5.70 (H) 05/02/2025                                                                                               Health  Risk Assessment    Smoking Status:  Social History     Tobacco Use   Smoking Status Never   Smokeless Tobacco Never     Alcohol Consumption:  Social History     Substance and Sexual Activity   Alcohol Use Yes    Alcohol/week: 14.0 standard drinks of alcohol    Types: 14 Glasses of wine per week    Comment: Beer or wine occas       Fall Risk Screen  STEADI Fall Risk Assessment was completed, and patient is at LOW risk for falls.Assessment completed on:6/25/2025    Depression Screening   Little interest or pleasure " in doing things? Not at all   Feeling down, depressed, or hopeless? Not at all   PHQ-2 Total Score 0      Health Habits and Functional and Cognitive Screenin/25/2025     8:59 AM   Functional & Cognitive Status   Do you have difficulty preparing food and eating? No   Do you have difficulty bathing yourself, getting dressed or grooming yourself? No   Do you have difficulty using the toilet? No   Do you have difficulty moving around from place to place? No   Do you have trouble with steps or getting out of a bed or a chair? No   Current Diet Well Balanced Diet   Dental Exam Up to date   Eye Exam Up to date   Exercise (times per week) 7 times per week   Current Exercises Include Walking;Weightlifting   Do you need help using the phone?  No   Are you deaf or do you have serious difficulty hearing?  No   Do you need help to go to places out of walking distance? No   Do you need help shopping? No   Do you need help preparing meals?  No   Do you need help with housework?  No   Do you need help with laundry? No   Do you need help taking your medications? No   Do you need help managing money? No   Do you ever drive or ride in a car without wearing a seat belt? No   Have you felt unusual fatigue (could be tiredness), stress, anger or loneliness in the last month? No   Who do you live with? Spouse   If you need help, do you have trouble finding someone available to you? No   Have you been bothered in the last four weeks by sexual problems? No   Do you have difficulty concentrating, remembering or making decisions? No           Age-appropriate Screening Schedule:  Refer to the list below for future screening recommendations based on patient's age, sex and/or medical conditions. Orders for these recommended tests are listed in the plan section. The patient has been provided with a written plan.    Health Maintenance List  Health Maintenance   Topic Date Due    COVID-19 Vaccine ( season) 2024    ANNUAL  WELLNESS VISIT  06/18/2025    INFLUENZA VACCINE  07/01/2025    TDAP/TD VACCINES (4 - Td or Tdap) 10/18/2026    COLORECTAL CANCER SCREENING  06/11/2029    HEPATITIS C SCREENING  Completed    Pneumococcal Vaccine 50+  Completed    ZOSTER VACCINE  Completed                                                                                                                                                CMS Preventative Services Quick Reference  Risk Factors Identified During Encounter  Fall Risk-High or Moderate: Information on Fall Prevention Shared in After Visit Summary  Normal Balance     The above risks/problems have been discussed with the patient.  Pertinent information has been shared with the patient in the After Visit Summary.  An After Visit Summary and PPPS were made available to the patient.    Follow Up:   Next Medicare Wellness visit to be scheduled in 1 year.     Assessment & Plan  IGT (impaired glucose tolerance)         Gastroesophageal reflux disease without esophagitis         Primary osteoarthritis of both knees         Encounter for annual wellness exam in Medicare patient              Follow Up:   No follow-ups on file.

## (undated) DEVICE — ADAPT CLN BIOGUARD AIR/H2O DISP

## (undated) DEVICE — SENSR O2 OXIMAX FNGR A/ 18IN NONSTR

## (undated) DEVICE — KT ORCA ORCAPOD DISP STRL

## (undated) DEVICE — TUBING, SUCTION, 1/4" X 10', STRAIGHT: Brand: MEDLINE

## (undated) DEVICE — SNAR POLYP SENSATION STDOVL 27 240 BX40

## (undated) DEVICE — CANN O2 ETCO2 FITS ALL CONN CO2 SMPL A/ 7IN DISP LF

## (undated) DEVICE — THE SINGLE USE ETRAP – POLYP TRAP IS USED FOR SUCTION RETRIEVAL OF ENDOSCOPICALLY REMOVED POLYPS.: Brand: ETRAP

## (undated) DEVICE — THE TORRENT IRRIGATION SCOPE CONNECTOR IS USED WITH THE TORRENT IRRIGATION TUBING TO PROVIDE IRRIGATION FLUIDS SUCH AS STERILE WATER DURING GASTROINTESTINAL ENDOSCOPIC PROCEDURES WHEN USED IN CONJUNCTION WITH AN IRRIGATION PUMP (OR ELECTROSURGICAL UNIT).: Brand: TORRENT

## (undated) DEVICE — LN SMPL CO2 SHTRM SD STREAM W/M LUER